# Patient Record
Sex: FEMALE | Race: WHITE | Employment: UNEMPLOYED | URBAN - METROPOLITAN AREA
[De-identification: names, ages, dates, MRNs, and addresses within clinical notes are randomized per-mention and may not be internally consistent; named-entity substitution may affect disease eponyms.]

---

## 2021-03-27 ENCOUNTER — IMMUNIZATION (OUTPATIENT)
Dept: PRIMARY CARE CLINIC | Facility: CLINIC | Age: 46
End: 2021-03-27

## 2021-03-27 DIAGNOSIS — Z23 NEED FOR VACCINATION: Primary | ICD-10-CM

## 2021-03-27 PROCEDURE — 0001A PR IMMUNIZ ADMIN, SARS-COV-2 COVID-19 VACC, 30MCG/0.3ML, 1ST DOSE: ICD-10-PCS | Mod: CV19,S$GLB,, | Performed by: INTERNAL MEDICINE

## 2021-03-27 PROCEDURE — 91300 PR SARS-COV- 2 COVID-19 VACCINE, NO PRSV, 30MCG/0.3ML, IM: CPT | Mod: S$GLB,,, | Performed by: INTERNAL MEDICINE

## 2021-03-27 PROCEDURE — 0001A PR IMMUNIZ ADMIN, SARS-COV-2 COVID-19 VACC, 30MCG/0.3ML, 1ST DOSE: CPT | Mod: CV19,S$GLB,, | Performed by: INTERNAL MEDICINE

## 2021-03-27 PROCEDURE — 91300 PR SARS-COV- 2 COVID-19 VACCINE, NO PRSV, 30MCG/0.3ML, IM: ICD-10-PCS | Mod: S$GLB,,, | Performed by: INTERNAL MEDICINE

## 2021-03-27 RX ADMIN — Medication 0.3 ML: at 02:03

## 2021-04-17 ENCOUNTER — IMMUNIZATION (OUTPATIENT)
Dept: PRIMARY CARE CLINIC | Facility: CLINIC | Age: 46
End: 2021-04-17

## 2021-04-17 DIAGNOSIS — Z23 NEED FOR VACCINATION: Primary | ICD-10-CM

## 2021-04-17 PROCEDURE — 0002A PR IMMUNIZ ADMIN, SARS-COV-2 COVID-19 VACC, 30MCG/0.3ML, 2ND DOSE: CPT | Mod: CV19,S$GLB,, | Performed by: INTERNAL MEDICINE

## 2021-04-17 PROCEDURE — 0002A PR IMMUNIZ ADMIN, SARS-COV-2 COVID-19 VACC, 30MCG/0.3ML, 2ND DOSE: ICD-10-PCS | Mod: CV19,S$GLB,, | Performed by: INTERNAL MEDICINE

## 2021-04-17 PROCEDURE — 91300 PR SARS-COV- 2 COVID-19 VACCINE, NO PRSV, 30MCG/0.3ML, IM: CPT | Mod: S$GLB,,, | Performed by: INTERNAL MEDICINE

## 2021-04-17 PROCEDURE — 91300 PR SARS-COV- 2 COVID-19 VACCINE, NO PRSV, 30MCG/0.3ML, IM: ICD-10-PCS | Mod: S$GLB,,, | Performed by: INTERNAL MEDICINE

## 2021-04-17 RX ADMIN — Medication 0.3 ML: at 10:04

## 2023-07-12 LAB
HUMAN PAPILLOMAVIRUS (HPV): NORMAL
PAP RECOMMENDATION EXT: NORMAL
PAP SMEAR: NORMAL

## 2025-02-12 ENCOUNTER — PATIENT OUTREACH (OUTPATIENT)
Dept: ADMINISTRATIVE | Facility: OTHER | Age: 50
End: 2025-02-12
Payer: COMMERCIAL

## 2025-02-12 ENCOUNTER — OFFICE VISIT (OUTPATIENT)
Dept: PRIMARY CARE CLINIC | Facility: CLINIC | Age: 50
End: 2025-02-12
Payer: COMMERCIAL

## 2025-02-12 ENCOUNTER — LAB VISIT (OUTPATIENT)
Dept: PRIMARY CARE CLINIC | Facility: CLINIC | Age: 50
End: 2025-02-12
Payer: COMMERCIAL

## 2025-02-12 VITALS
SYSTOLIC BLOOD PRESSURE: 134 MMHG | DIASTOLIC BLOOD PRESSURE: 83 MMHG | WEIGHT: 169 LBS | HEART RATE: 77 BPM | RESPIRATION RATE: 18 BRPM | OXYGEN SATURATION: 98 %

## 2025-02-12 DIAGNOSIS — Z13.220 SCREENING CHOLESTEROL LEVEL: ICD-10-CM

## 2025-02-12 DIAGNOSIS — Z11.59 ENCOUNTER FOR HEPATITIS C SCREENING TEST FOR LOW RISK PATIENT: ICD-10-CM

## 2025-02-12 DIAGNOSIS — Z13.29 SCREENING FOR THYROID DISORDER: ICD-10-CM

## 2025-02-12 DIAGNOSIS — Z00.00 ENCOUNTER FOR SCREENING AND PREVENTATIVE CARE: ICD-10-CM

## 2025-02-12 DIAGNOSIS — Z13.1 SCREENING FOR DIABETES MELLITUS: ICD-10-CM

## 2025-02-12 DIAGNOSIS — Z11.4 SCREENING FOR HIV (HUMAN IMMUNODEFICIENCY VIRUS): ICD-10-CM

## 2025-02-12 DIAGNOSIS — R92.8 ABNORMAL MAMMOGRAM: Primary | ICD-10-CM

## 2025-02-12 LAB
ALBUMIN SERPL BCP-MCNC: 4 G/DL (ref 3.5–5.2)
ALP SERPL-CCNC: 75 U/L (ref 40–150)
ALT SERPL W/O P-5'-P-CCNC: 17 U/L (ref 10–44)
ANION GAP SERPL CALC-SCNC: 9 MMOL/L (ref 8–16)
AST SERPL-CCNC: 16 U/L (ref 10–40)
B-HCG UR QL: NEGATIVE
BACTERIA #/AREA URNS AUTO: ABNORMAL /HPF
BASOPHILS # BLD AUTO: 0.04 K/UL (ref 0–0.2)
BASOPHILS NFR BLD: 0.7 % (ref 0–1.9)
BILIRUB SERPL-MCNC: 0.3 MG/DL (ref 0.1–1)
BILIRUB UR QL STRIP: NEGATIVE
BUN SERPL-MCNC: 14 MG/DL (ref 6–20)
CALCIUM SERPL-MCNC: 9.5 MG/DL (ref 8.7–10.5)
CHLORIDE SERPL-SCNC: 108 MMOL/L (ref 95–110)
CHOLEST SERPL-MCNC: 210 MG/DL (ref 120–199)
CHOLEST/HDLC SERPL: 3.2 {RATIO} (ref 2–5)
CLARITY UR REFRACT.AUTO: ABNORMAL
CO2 SERPL-SCNC: 23 MMOL/L (ref 23–29)
COLOR UR AUTO: ABNORMAL
CREAT SERPL-MCNC: 0.7 MG/DL (ref 0.5–1.4)
CTP QC/QA: YES
DIFFERENTIAL METHOD BLD: ABNORMAL
EOSINOPHIL # BLD AUTO: 0.1 K/UL (ref 0–0.5)
EOSINOPHIL NFR BLD: 0.9 % (ref 0–8)
ERYTHROCYTE [DISTWIDTH] IN BLOOD BY AUTOMATED COUNT: 12.6 % (ref 11.5–14.5)
EST. GFR  (NO RACE VARIABLE): >60 ML/MIN/1.73 M^2
ESTIMATED AVG GLUCOSE: 111 MG/DL (ref 68–131)
GLUCOSE SERPL-MCNC: 85 MG/DL (ref 70–110)
GLUCOSE UR QL STRIP: NEGATIVE
HBA1C MFR BLD: 5.5 % (ref 4–5.6)
HCT VFR BLD AUTO: 47.1 % (ref 37–48.5)
HCV AB SERPL QL IA: NORMAL
HDLC SERPL-MCNC: 65 MG/DL (ref 40–75)
HDLC SERPL: 31 % (ref 20–50)
HGB BLD-MCNC: 14.8 G/DL (ref 12–16)
HGB UR QL STRIP: NEGATIVE
HIV 1+2 AB+HIV1 P24 AG SERPL QL IA: NORMAL
IMM GRANULOCYTES # BLD AUTO: 0.01 K/UL (ref 0–0.04)
IMM GRANULOCYTES NFR BLD AUTO: 0.2 % (ref 0–0.5)
KETONES UR QL STRIP: NEGATIVE
LDLC SERPL CALC-MCNC: 129 MG/DL (ref 63–159)
LEUKOCYTE ESTERASE UR QL STRIP: ABNORMAL
LYMPHOCYTES # BLD AUTO: 1.7 K/UL (ref 1–4.8)
LYMPHOCYTES NFR BLD: 29.6 % (ref 18–48)
MCH RBC QN AUTO: 29.1 PG (ref 27–31)
MCHC RBC AUTO-ENTMCNC: 31.4 G/DL (ref 32–36)
MCV RBC AUTO: 93 FL (ref 82–98)
MICROSCOPIC COMMENT: ABNORMAL
MONOCYTES # BLD AUTO: 0.4 K/UL (ref 0.3–1)
MONOCYTES NFR BLD: 6.6 % (ref 4–15)
NEUTROPHILS # BLD AUTO: 3.5 K/UL (ref 1.8–7.7)
NEUTROPHILS NFR BLD: 62 % (ref 38–73)
NITRITE UR QL STRIP: NEGATIVE
NONHDLC SERPL-MCNC: 145 MG/DL
NRBC BLD-RTO: 0 /100 WBC
PH UR STRIP: 6 [PH] (ref 5–8)
PLATELET # BLD AUTO: 182 K/UL (ref 150–450)
PMV BLD AUTO: 11.7 FL (ref 9.2–12.9)
POTASSIUM SERPL-SCNC: 4 MMOL/L (ref 3.5–5.1)
PROT SERPL-MCNC: 7.4 G/DL (ref 6–8.4)
PROT UR QL STRIP: ABNORMAL
RBC # BLD AUTO: 5.08 M/UL (ref 4–5.4)
RBC #/AREA URNS AUTO: 2 /HPF (ref 0–4)
SODIUM SERPL-SCNC: 140 MMOL/L (ref 136–145)
SP GR UR STRIP: 1.03 (ref 1–1.03)
SQUAMOUS #/AREA URNS AUTO: 4 /HPF
T4 FREE SERPL-MCNC: 0.8 NG/DL (ref 0.71–1.51)
TRIGL SERPL-MCNC: 80 MG/DL (ref 30–150)
TSH SERPL DL<=0.005 MIU/L-ACNC: 3.27 UIU/ML (ref 0.4–4)
URN SPEC COLLECT METH UR: ABNORMAL
WBC # BLD AUTO: 5.57 K/UL (ref 3.9–12.7)
WBC #/AREA URNS AUTO: 0 /HPF (ref 0–5)

## 2025-02-12 PROCEDURE — 1159F MED LIST DOCD IN RCRD: CPT | Mod: CPTII,S$GLB,, | Performed by: NURSE PRACTITIONER

## 2025-02-12 PROCEDURE — 87389 HIV-1 AG W/HIV-1&-2 AB AG IA: CPT | Performed by: NURSE PRACTITIONER

## 2025-02-12 PROCEDURE — 85025 COMPLETE CBC W/AUTO DIFF WBC: CPT | Performed by: NURSE PRACTITIONER

## 2025-02-12 PROCEDURE — 81025 URINE PREGNANCY TEST: CPT | Mod: S$GLB,,, | Performed by: NURSE PRACTITIONER

## 2025-02-12 PROCEDURE — 80061 LIPID PANEL: CPT | Performed by: NURSE PRACTITIONER

## 2025-02-12 PROCEDURE — 99386 PREV VISIT NEW AGE 40-64: CPT | Mod: S$GLB,,, | Performed by: NURSE PRACTITIONER

## 2025-02-12 PROCEDURE — 83036 HEMOGLOBIN GLYCOSYLATED A1C: CPT | Performed by: NURSE PRACTITIONER

## 2025-02-12 PROCEDURE — 3075F SYST BP GE 130 - 139MM HG: CPT | Mod: CPTII,S$GLB,, | Performed by: NURSE PRACTITIONER

## 2025-02-12 PROCEDURE — 86803 HEPATITIS C AB TEST: CPT | Performed by: NURSE PRACTITIONER

## 2025-02-12 PROCEDURE — 99999 PR PBB SHADOW E&M-NEW PATIENT-LVL III: CPT | Mod: PBBFAC,,, | Performed by: NURSE PRACTITIONER

## 2025-02-12 PROCEDURE — 84439 ASSAY OF FREE THYROXINE: CPT | Performed by: NURSE PRACTITIONER

## 2025-02-12 PROCEDURE — 80053 COMPREHEN METABOLIC PANEL: CPT | Performed by: NURSE PRACTITIONER

## 2025-02-12 PROCEDURE — 81001 URINALYSIS AUTO W/SCOPE: CPT | Performed by: NURSE PRACTITIONER

## 2025-02-12 PROCEDURE — 99213 OFFICE O/P EST LOW 20 MIN: CPT | Mod: 25,S$GLB,, | Performed by: NURSE PRACTITIONER

## 2025-02-12 PROCEDURE — 3079F DIAST BP 80-89 MM HG: CPT | Mod: CPTII,S$GLB,, | Performed by: NURSE PRACTITIONER

## 2025-02-12 PROCEDURE — 84443 ASSAY THYROID STIM HORMONE: CPT | Performed by: NURSE PRACTITIONER

## 2025-02-12 NOTE — PROGRESS NOTES
Subjective:       Patient ID: Otilia Meier is a 49 y.o. female.    Chief Complaint: Establish Care (Need Mammogram ordered)    History of Present Illness    CHIEF COMPLAINT:  Patient presents today to establish care and follow up on abnormal mammogram.    BREAST HEALTH:  Mammogram in January 2025 at [facility] revealed abnormality. She denies any family history of breast cancer or palpable breast masses.    GYNECOLOGIC HISTORY:  She has Mirena IUD in place. Last pap smear was performed over 2 years ago.    MEDICAL HISTORY:  Last primary care visit was in June 2024 for routine bloodwork and preventive care. She reports infrequent medical visits due to rarely being ill. She denies any known history of vitamin D deficiency.    SOCIAL HISTORY:  She is a single parent and  of one year with two children ages 5 and 21. She resides between [place] and [place].     No past medical history on file.     No past surgical history on file.     No family history on file.    Social History     Tobacco Use   Smoking Status Not on file   Smokeless Tobacco Not on file       Social History     Social History Narrative    Not on file       Review of patient's allergies indicates:   Allergen Reactions    Hydrocodone-acetaminophen Other (See Comments)     vomiting        Review of Systems   Respiratory:  Negative for chest tightness and shortness of breath.    Cardiovascular:  Negative for chest pain and palpitations.   Gastrointestinal:  Negative for nausea and vomiting.   Neurological:  Negative for dizziness, light-headedness and headaches.   Psychiatric/Behavioral:  The patient is nervous/anxious.          Objective:      Vitals:    02/12/25 0827   BP: 134/83   BP Location: Left arm   Patient Position: Sitting   Pulse: 77   Resp: 18   SpO2: 98%   Weight: 76.6 kg (168 lb 15.7 oz)        Physical Exam  Constitutional:       Appearance: Normal appearance.   Pulmonary:      Effort: Pulmonary effort is  normal. No respiratory distress.   Neurological:      Mental Status: She is alert. She is disoriented.   Psychiatric:         Mood and Affect: Mood is anxious. Affect is tearful.         Speech: Speech normal.         Behavior: Behavior is cooperative.         Assessment:       1. Abnormal mammogram    2. Encounter for screening and preventative care    3. Screening cholesterol level    4. Encounter for hepatitis C screening test for low risk patient    5. Screening for diabetes mellitus    6. Screening for thyroid disorder    7. Screening for HIV (human immunodeficiency virus)        Plan:       Abnormal mammogram  Comments:  Jan 2025 [place], [place]  Orders:  -     Mammo Digital Screening Bilat w/ Nguyễn; Future; Expected date: 02/12/2025    Encounter for screening and preventative care  -     CBC Auto Differential; Future; Expected date: 02/12/2025  -     Comprehensive Metabolic Panel; Future; Expected date: 02/12/2025  -     Urinalysis, Reflex to Urine Culture Urine, Clean Catch  -     POCT urine pregnancy    Screening cholesterol level  -     Lipid Panel; Future; Expected date: 02/12/2025    Encounter for hepatitis C screening test for low risk patient  -     Hepatitis C Antibody; Future; Expected date: 02/12/2025    Screening for diabetes mellitus  -     Hemoglobin A1C; Future; Expected date: 02/12/2025    Screening for thyroid disorder  -     T4, Free; Future; Expected date: 02/12/2025  -     TSH; Future; Expected date: 02/12/2025    Screening for HIV (human immunodeficiency virus)  -     HIV 1/2 Ag/Ab (4th Gen); Future; Expected date: 02/12/2025         Assessment & Plan  ABNORMAL MAMMOGRAM:  - Explained the need for follow-up mammogram.  - Requested the patient to upload previous mammogram report from January 2025 to the patient portal.  - Ordered a new mammogram for further evaluation.  - Planned to request records from [facility] in [place], [place].  - Will review  the previous records upon receipt.    IUD MANAGEMENT:  - Noted that the patient has had an IUD for an extended period, which has eliminated her menstrual cycle.  - Confirmed that the patient believes she has a Mirena IUD.  - Acknowledged the presence of the IUD and its impact on the patient's menstrual cycle.    PREVENTATIVE CARE:  - Discussed importance of preventative healthcare and annual check-ups.  - Patient to sign release of information form for previous medical records.  - Blood work ordered: diabetes screening, cholesterol, anemia check, kidney and liver function, electrolytes, thyroid function.  - Urinalysis ordered.    PRIMARY CARE REFERRAL:  - Referred to Dr. Bo for establishing care.    FOLLOW UP:  - Follow up in 1 month with Dr. Bo.  - Contact the office if any concerns arise before next appointment.     Follow up in about 1 month (around 3/12/2025) for Dr. Kelly Bo establish care.    I spent a total of 40 minutes on the day of the visit.This includes face to face time and non-face to face time preparing to see the patient (eg, review of tests), obtaining and/or reviewing separately obtained history, documenting clinical information in the electronic or other health record, independently interpreting results and communicating results to the patient/family/caregiver, or care coordinator.     YUDY Lr, MSN, FNP-C     This note was generated with the assistance of ambient listening technology. Verbal consent was obtained by the patient and accompanying visitor(s) for the recording of patient appointment to facilitate this note. I attest to having reviewed and edited the generated note for accuracy, though some syntax or spelling errors may persist. Please contact the author of this note for any clarification.

## 2025-02-12 NOTE — PROGRESS NOTES
CHW - Initial Contact    This Community Health Worker completed  the Social Determinant of Health questionnaire with patient during clinic visit today.    Pt identified barriers of most importance are: NONE   Referrals to community agencies completed with patient/caregiver consent outside of [facility] include: NONE  Referrals were put through [facility] - : NO  Support and Services: No support & services have been documented.  Other information discussed the patient needs / wants help with: NONE   Follow up required: NO  No future outreach task assigned

## 2025-02-12 NOTE — PATIENT INSTRUCTIONS
You can call any of the following numbers to schedule your referral, if you could not get scheduled today: [phone] or [phone].

## 2025-02-13 ENCOUNTER — HOSPITAL ENCOUNTER (OUTPATIENT)
Dept: RADIOLOGY | Facility: HOSPITAL | Age: 50
Discharge: HOME | End: 2025-02-13
Attending: NURSE PRACTITIONER
Payer: COMMERCIAL

## 2025-02-13 VITALS — WEIGHT: 168 LBS | HEIGHT: 63 IN | BODY MASS INDEX: 29.77 KG/M2

## 2025-02-13 DIAGNOSIS — R92.8 ABNORMAL MAMMOGRAM: ICD-10-CM

## 2025-02-27 ENCOUNTER — HOSPITAL ENCOUNTER (OUTPATIENT)
Dept: RADIOLOGY | Facility: HOSPITAL | Age: 50
Discharge: HOME | End: 2025-02-27
Attending: NURSE PRACTITIONER
Payer: COMMERCIAL

## 2025-02-27 ENCOUNTER — TELEPHONE (OUTPATIENT)
Dept: RADIOLOGY | Facility: HOSPITAL | Age: 50
End: 2025-02-27

## 2025-02-27 DIAGNOSIS — R92.8 ABNORMAL MAMMOGRAM: ICD-10-CM

## 2025-02-27 PROCEDURE — 77065 DX MAMMO INCL CAD UNI: CPT | Mod: 26,LT,, | Performed by: RADIOLOGY

## 2025-02-27 PROCEDURE — 77065 DX MAMMO INCL CAD UNI: CPT | Mod: TC,LT

## 2025-02-27 PROCEDURE — 77061 BREAST TOMOSYNTHESIS UNI: CPT | Mod: 26,LT,, | Performed by: RADIOLOGY

## 2025-02-27 NOTE — TELEPHONE ENCOUNTER
Spoke with patient. Reviewed breast biopsy procedure and reviewed instructions for breast biopsy. Patient expressed understanding and all questions were answered. Provided patient with my phone number to call for any further concerns or questions.   Patient scheduled breast biopsy at the [facility] on 3/13/2025.

## 2025-03-08 ENCOUNTER — TELEPHONE (OUTPATIENT)
Dept: PRIMARY CARE CLINIC | Facility: CLINIC | Age: 50
End: 2025-03-08
Payer: COMMERCIAL

## 2025-03-08 NOTE — TELEPHONE ENCOUNTER
Called pt to r/s visit due to provider being out of office, pt informed and agreed to another time.

## 2025-03-13 ENCOUNTER — HOSPITAL ENCOUNTER (OUTPATIENT)
Dept: RADIOLOGY | Facility: HOSPITAL | Age: 50
Discharge: HOME | End: 2025-03-13
Attending: NURSE PRACTITIONER
Payer: COMMERCIAL

## 2025-03-13 DIAGNOSIS — R92.8 ABNORMAL FINDING ON BREAST IMAGING: Primary | ICD-10-CM

## 2025-03-13 PROCEDURE — 19081 BX BREAST 1ST LESION STRTCTC: CPT | Mod: LT,,, | Performed by: RADIOLOGY

## 2025-03-13 PROCEDURE — 63600175 PHARM REV CODE 636 W HCPCS: Performed by: NURSE PRACTITIONER

## 2025-03-13 PROCEDURE — 77065 DX MAMMO INCL CAD UNI: CPT | Mod: 26,LT,, | Performed by: RADIOLOGY

## 2025-03-13 PROCEDURE — 27200939 MAMMO BREAST STEREOTACTIC BREAST BIOPSY LEFT

## 2025-03-13 PROCEDURE — 77065 DX MAMMO INCL CAD UNI: CPT | Mod: TC,LT

## 2025-03-13 RX ORDER — LIDOCAINE HYDROCHLORIDE 10 MG/ML
3 INJECTION, SOLUTION EPIDURAL; INFILTRATION; INTRACAUDAL; PERINEURAL ONCE
Status: DISCONTINUED | OUTPATIENT
Start: 2025-03-13 | End: 2025-03-14 | Stop reason: HOSPADM

## 2025-03-13 RX ORDER — LIDOCAINE HYDROCHLORIDE AND EPINEPHRINE 10; 20 UG/ML; MG/ML
20 INJECTION, SOLUTION INFILTRATION; PERINEURAL ONCE
Status: DISCONTINUED | OUTPATIENT
Start: 2025-03-13 | End: 2025-03-14 | Stop reason: HOSPADM

## 2025-03-13 RX ORDER — LIDOCAINE HYDROCHLORIDE AND EPINEPHRINE 10; 20 UG/ML; MG/ML
20 INJECTION, SOLUTION INFILTRATION; PERINEURAL ONCE
Status: COMPLETED | OUTPATIENT
Start: 2025-03-13 | End: 2025-03-13

## 2025-03-13 RX ORDER — LIDOCAINE HYDROCHLORIDE 10 MG/ML
3 INJECTION, SOLUTION EPIDURAL; INFILTRATION; INTRACAUDAL; PERINEURAL ONCE
Status: COMPLETED | OUTPATIENT
Start: 2025-03-13 | End: 2025-03-13

## 2025-03-13 RX ADMIN — LIDOCAINE HYDROCHLORIDE 3 ML: 10 INJECTION, SOLUTION EPIDURAL; INFILTRATION; INTRACAUDAL; PERINEURAL at 01:03

## 2025-03-13 RX ADMIN — LIDOCAINE HYDROCHLORIDE,EPINEPHRINE BITARTRATE 20 ML: 20; .01 INJECTION, SOLUTION INFILTRATION; PERINEURAL at 01:03

## 2025-03-17 ENCOUNTER — PATIENT OUTREACH (OUTPATIENT)
Dept: ADMINISTRATIVE | Facility: HOSPITAL | Age: 50
End: 2025-03-17
Payer: COMMERCIAL

## 2025-03-17 ENCOUNTER — OFFICE VISIT (OUTPATIENT)
Dept: PRIMARY CARE CLINIC | Facility: CLINIC | Age: 50
End: 2025-03-17
Payer: COMMERCIAL

## 2025-03-17 VITALS
WEIGHT: 175.13 LBS | SYSTOLIC BLOOD PRESSURE: 139 MMHG | BODY MASS INDEX: 31.03 KG/M2 | HEART RATE: 59 BPM | DIASTOLIC BLOOD PRESSURE: 84 MMHG | TEMPERATURE: 99 F | OXYGEN SATURATION: 98 % | HEIGHT: 63 IN

## 2025-03-17 DIAGNOSIS — E78.5 HYPERLIPIDEMIA, UNSPECIFIED HYPERLIPIDEMIA TYPE: Primary | ICD-10-CM

## 2025-03-17 PROCEDURE — 3079F DIAST BP 80-89 MM HG: CPT | Mod: CPTII,S$GLB,, | Performed by: FAMILY MEDICINE

## 2025-03-17 PROCEDURE — 99213 OFFICE O/P EST LOW 20 MIN: CPT | Mod: S$GLB,,, | Performed by: FAMILY MEDICINE

## 2025-03-17 PROCEDURE — 3075F SYST BP GE 130 - 139MM HG: CPT | Mod: CPTII,S$GLB,, | Performed by: FAMILY MEDICINE

## 2025-03-17 PROCEDURE — 99999 PR PBB SHADOW E&M-EST. PATIENT-LVL III: CPT | Mod: PBBFAC,,, | Performed by: FAMILY MEDICINE

## 2025-03-17 PROCEDURE — 3008F BODY MASS INDEX DOCD: CPT | Mod: CPTII,S$GLB,, | Performed by: FAMILY MEDICINE

## 2025-03-17 PROCEDURE — 1159F MED LIST DOCD IN RCRD: CPT | Mod: CPTII,S$GLB,, | Performed by: FAMILY MEDICINE

## 2025-03-17 PROCEDURE — 3044F HG A1C LEVEL LT 7.0%: CPT | Mod: CPTII,S$GLB,, | Performed by: FAMILY MEDICINE

## 2025-03-17 PROCEDURE — 1160F RVW MEDS BY RX/DR IN RCRD: CPT | Mod: CPTII,S$GLB,, | Performed by: FAMILY MEDICINE

## 2025-03-18 ENCOUNTER — RESULTS FOLLOW-UP (OUTPATIENT)
Dept: RADIOLOGY | Facility: HOSPITAL | Age: 50
End: 2025-03-18

## 2025-03-19 ENCOUNTER — TELEPHONE (OUTPATIENT)
Dept: SURGERY | Facility: CLINIC | Age: 50
End: 2025-03-19
Payer: COMMERCIAL

## 2025-03-19 NOTE — TELEPHONE ENCOUNTER
Contacted the patient regarding results of recent breast biopsy. Patient did not answer, message left with my name and direct number for patient to contact back.

## 2025-03-19 NOTE — PROGRESS NOTES
Subjective:       Patient ID: Otilia Meier is a 49 y.o. female.    Chief Complaint: Follow-up    History of Present Illness    CHIEF COMPLAINT:  Patient presents today for follow-up    BREAST HEALTH:  She has a 15-year history of breast issues and is currently awaiting pathology results from her third breast biopsy. She had a lump removed approximately 10 years ago. She denies strong family history of breast cancer.    PREVENTIVE HEALTH:  Her mammogram is up to date. Her last pap smear was 2-3 years ago while living in [place].    LABS:  Cholesterol was borderline at 210 with LDL at 129. Urinalysis was negative for UTI. Kidney and liver function tests were normal. Complete blood count showed no evidence of anemia. Thyroid function tests were within normal limits.    SOCIAL HISTORY:  She has never smoked and denies current alcohol or illicit drug use. She frequently cooks at home, avoiding fried foods. She maintains a garden and reports improved eating habits during spring and summer months.    FAMILY HISTORY:  Both parents are living but overweight with suspected hypertension and alcoholism. Grandmother is 90 years old and in relatively good health. Maternal grandmother  from brain aneurysm, maternal grandfather  of heart attack, and paternal grandfather  of natural causes.      ROS:  Breasts: +breast lumps          Review of patient's allergies indicates:   Allergen Reactions    Hydrocodone-acetaminophen Other (See Comments)     vomiting       Current Medications[1]    History reviewed. No pertinent past medical history.   Past Surgical History:   Procedure Laterality Date    AUGMENTATION OF BREAST Bilateral     15 years ago    BREAST BIOPSY Left     benign excisional biopsy 6 years ago    BREAST RECONSTRUCTION Bilateral     15 YEARS AGO.  reduction and lift      Social History     Socioeconomic History    Marital status:    Tobacco Use    Smoking status: Never    Smokeless tobacco:  Never   Substance and Sexual Activity    Alcohol use: Not Currently    Drug use: Never    Sexual activity: Not Currently     Social Drivers of Health     Financial Resource Strain: Low Risk  (3/11/2025)    Overall Financial Resource Strain (CARDIA)     Difficulty of Paying Living Expenses: Not hard at all   Food Insecurity: No Food Insecurity (3/11/2025)    Hunger Vital Sign     Worried About Running Out of Food in the Last Year: Never true     Ran Out of Food in the Last Year: Never true   Transportation Needs: No Transportation Needs (3/11/2025)    PRAPARE - Transportation     Lack of Transportation (Medical): No     Lack of Transportation (Non-Medical): No   Physical Activity: Sufficiently Active (3/11/2025)    Exercise Vital Sign     Days of Exercise per Week: 2 days     Minutes of Exercise per Session: 120 min   Stress: Stress Concern Present (3/11/2025)    [employer] - Occupational Stress Questionnaire     Feeling of Stress : To some extent   Housing Stability: Low Risk  (3/11/2025)    Housing Stability Vital Sign     Unable to Pay for Housing in the Last Year: No     Number of Times Moved in the Last Year: 0     Homeless in the Last Year: No      Family History   Problem Relation Name Age of Onset    Alcohol abuse Mother      Alcohol abuse Father      Brain aneurysm Maternal Grandmother      Heart disease Paternal Grandfather          MI     Review of Systems    Objective:      Physical Exam      Physical exam:  General: Alert, no acute distress   HEENT:  NC/AT, no facial swelling of deformity  Neck:  supple  Resp:  Normal effort, no respiratory distress   Psych:  Normal affect events          Assessment:       1. Hyperlipidemia, unspecified hyperlipidemia type        Plan:         Otilia was seen today for follow-up.    Diagnoses and all orders for this visit:    Hyperlipidemia, unspecified hyperlipidemia type        Assessment & Plan    HYPERLIPIDEMIA:  - Evaluated the patient's  recent cholesterol levels from lab work.  - Total cholesterol is 210, which is borderline high.  - LDL cholesterol is 129, which is within the normal range but close to the upper limit of 130.  - Assessed that the cholesterol levels are borderline but not alarming.  - Advised the patient to be cautious with fried food consumption.  - Patient to continue home cooking and gardening practices to maintain a healthy diet.  - Recommend considering oven-frying chicken as a healthier alternative to deep-frying.    BREAST ISSUES:  - Noted the patient's history of breast issues, including multiple biopsies and a lump removal over the past 15 years.  - Awaiting pathology results from a recent breast biopsy.  - Plan to review mammogram results when available.  - Will follow up on biopsy results to address ongoing breast issues.    FAMILY HISTORY OF CARDIOVASCULAR DISEASE:  - Documented that the patient's maternal grandfather  of a myocardial infarction.  - Acknowledged the family history of cardiovascular disease.  - Documented that the patient's maternal grandmother passed away from a cerebral aneurysm.    FAMILY HISTORY OF ALCOHOLISM:  - Noted that both of the patient's parents have a history of alcoholism.          Kelly Bo MD        This note was generated with the assistance of ambient listening technology. Verbal consent was obtained by the patient and accompanying visitor(s) for the recording of patient appointment to facilitate this note. I attest to having reviewed and edited the generated note for accuracy, though some syntax or spelling errors may persist. Please contact the author of this note for any clarification.   Wishes to stay from         [1] No current outpatient medications on file.

## 2025-03-21 ENCOUNTER — TELEPHONE (OUTPATIENT)
Dept: SURGERY | Facility: CLINIC | Age: 50
End: 2025-03-21
Payer: COMMERCIAL

## 2025-03-21 NOTE — TELEPHONE ENCOUNTER
Called patient with biopsy results from 3/13/2025. Biopsy results showed Flat Epithelial Lesion (FEA). Discussed what this means and the results were reviewed by Dr. Muniz . These results are benign, concordant and a surgical consult is recommended. Patient was scheduled on 3/31/2025 with Dr. Harris. Reviewed Breast center location. Patient verbalized understanding.      Charlene Muniz MD to [facility] Breast Navigation (Selected Message)      3/18/25  4:43 PM  Result Note  Benign and concordant. Recommend surgical consult for flat epithelial atypia.

## 2025-03-26 ENCOUNTER — RESULTS FOLLOW-UP (OUTPATIENT)
Dept: PRIMARY CARE CLINIC | Facility: CLINIC | Age: 50
End: 2025-03-26

## 2025-03-26 DIAGNOSIS — N60.82 FLAT EPITHELIAL ATYPIA OF BREAST, LEFT: Primary | ICD-10-CM

## 2025-04-07 ENCOUNTER — TELEPHONE (OUTPATIENT)
Dept: PLASTIC SURGERY | Facility: CLINIC | Age: 50
End: 2025-04-07
Payer: COMMERCIAL

## 2025-04-07 ENCOUNTER — OFFICE VISIT (OUTPATIENT)
Dept: SURGERY | Facility: CLINIC | Age: 50
End: 2025-04-07
Payer: COMMERCIAL

## 2025-04-07 VITALS — DIASTOLIC BLOOD PRESSURE: 83 MMHG | HEART RATE: 65 BPM | SYSTOLIC BLOOD PRESSURE: 122 MMHG

## 2025-04-07 DIAGNOSIS — N60.82 FLAT EPITHELIAL ATYPIA OF BREAST, LEFT: ICD-10-CM

## 2025-04-07 PROCEDURE — 1160F RVW MEDS BY RX/DR IN RCRD: CPT | Mod: CPTII,S$GLB,, | Performed by: SURGERY

## 2025-04-07 PROCEDURE — 1159F MED LIST DOCD IN RCRD: CPT | Mod: CPTII,S$GLB,, | Performed by: SURGERY

## 2025-04-07 PROCEDURE — 3079F DIAST BP 80-89 MM HG: CPT | Mod: CPTII,S$GLB,, | Performed by: SURGERY

## 2025-04-07 PROCEDURE — 3044F HG A1C LEVEL LT 7.0%: CPT | Mod: CPTII,S$GLB,, | Performed by: SURGERY

## 2025-04-07 PROCEDURE — 99999 PR PBB SHADOW E&M-EST. PATIENT-LVL III: CPT | Mod: PBBFAC,,, | Performed by: SURGERY

## 2025-04-07 PROCEDURE — 3074F SYST BP LT 130 MM HG: CPT | Mod: CPTII,S$GLB,, | Performed by: SURGERY

## 2025-04-07 PROCEDURE — 99204 OFFICE O/P NEW MOD 45 MIN: CPT | Mod: S$GLB,,, | Performed by: SURGERY

## 2025-04-07 NOTE — PROGRESS NOTES
History & Physical    Subjective     History of Present Illness:  Patient is a 50 y.o. female presents with  atypia noted on core biopsy upper outer left breast 10 cm from the nipple  Referred for excision to rule out co-existing malignancy    Chief Complaint   Patient presents with    Consult       Review of patient's allergies indicates:   Allergen Reactions    Hydrocodone-acetaminophen Other (See Comments)     vomiting       Current Medications[1]    History reviewed. No pertinent past medical history.  Past Surgical History:   Procedure Laterality Date    AUGMENTATION OF BREAST Bilateral     15 years ago    BREAST BIOPSY Left     benign excisional biopsy 6 years ago    BREAST RECONSTRUCTION Bilateral     15 YEARS AGO.  reduction and lift     Family History   Problem Relation Name Age of Onset    Alcohol abuse Mother      Alcohol abuse Father      Brain aneurysm Maternal Grandmother      Heart disease Paternal Grandfather          MI     Social History[2]     Review of Systems:  Review of Systems   Constitutional: Negative.    Respiratory: Negative.     Cardiovascular: Negative.    Endocrine: Negative.    Musculoskeletal: Negative.    Allergic/Immunologic: Negative.    Neurological: Negative.    Hematological: Negative.    Psychiatric/Behavioral: Negative.            Objective     Vital Signs (Most Recent)  Pulse: 65 (04/07/25 0836)  BP: 122/83 (04/07/25 0836)           Physical Exam:  Physical Exam  Constitutional:       Appearance: Normal appearance. She is normal weight.   HENT:      Head: Normocephalic and atraumatic.      Nose: Nose normal.   Cardiovascular:      Rate and Rhythm: Normal rate and regular rhythm.      Pulses: Normal pulses.   Pulmonary:      Effort: Pulmonary effort is normal.      Comments: Normal breast exam  She has the unique history of a breast reduction with implants which she does not like  Musculoskeletal:         General: Normal range of motion.      Cervical back: Normal range of  motion.   Skin:     General: Skin is warm and dry.   Neurological:      General: No focal deficit present.      Mental Status: She is alert and oriented to person, place, and time.   Psychiatric:         Mood and Affect: Mood normal.         Behavior: Behavior normal.                Assessment and Plan   Left breast atypia    PLAN:    Radar reflector localized left breast excision  Will ask Plastics to see patient - can consider implant removal (per patient request) at the time of lumepctomy              [1]   No current outpatient medications on file.     No current facility-administered medications for this visit.   [2]   Social History  Tobacco Use    Smoking status: Never    Smokeless tobacco: Never   Substance Use Topics    Alcohol use: Not Currently    Drug use: Never

## 2025-04-14 ENCOUNTER — OFFICE VISIT (OUTPATIENT)
Dept: PLASTIC SURGERY | Facility: CLINIC | Age: 50
End: 2025-04-14
Payer: COMMERCIAL

## 2025-04-14 VITALS
SYSTOLIC BLOOD PRESSURE: 138 MMHG | HEIGHT: 63 IN | HEART RATE: 60 BPM | BODY MASS INDEX: 30.51 KG/M2 | WEIGHT: 172.19 LBS | DIASTOLIC BLOOD PRESSURE: 81 MMHG

## 2025-04-14 DIAGNOSIS — N60.82 FLAT EPITHELIAL ATYPIA (FEA) OF LEFT BREAST: ICD-10-CM

## 2025-04-14 DIAGNOSIS — Z98.82 HISTORY OF BREAST IMPLANT: Primary | ICD-10-CM

## 2025-04-14 PROCEDURE — 1159F MED LIST DOCD IN RCRD: CPT | Mod: CPTII,S$GLB,, | Performed by: SURGERY

## 2025-04-14 PROCEDURE — 3008F BODY MASS INDEX DOCD: CPT | Mod: CPTII,S$GLB,, | Performed by: SURGERY

## 2025-04-14 PROCEDURE — 3075F SYST BP GE 130 - 139MM HG: CPT | Mod: CPTII,S$GLB,, | Performed by: SURGERY

## 2025-04-14 PROCEDURE — 1160F RVW MEDS BY RX/DR IN RCRD: CPT | Mod: CPTII,S$GLB,, | Performed by: SURGERY

## 2025-04-14 PROCEDURE — 3044F HG A1C LEVEL LT 7.0%: CPT | Mod: CPTII,S$GLB,, | Performed by: SURGERY

## 2025-04-14 PROCEDURE — 3079F DIAST BP 80-89 MM HG: CPT | Mod: CPTII,S$GLB,, | Performed by: SURGERY

## 2025-04-14 PROCEDURE — 99999 PR PBB SHADOW E&M-EST. PATIENT-LVL III: CPT | Mod: PBBFAC,,, | Performed by: SURGERY

## 2025-04-14 PROCEDURE — 99204 OFFICE O/P NEW MOD 45 MIN: CPT | Mod: S$GLB,,, | Performed by: SURGERY

## 2025-04-14 NOTE — PROGRESS NOTES
Plastic & Reconstructive Surgery    Breast Reconstruction Consult     Patient: Otilia Meier  MRN: [id number]  : 1975  Date of Service: 2025  PCP: Kelly Bo MD  Referring Provider:  No ref. provider found     Chief Complaint: implant removal / L breast atypia     History of Present Illness:  Otilia Meier is a 50 y.o. female who presents with recent diagnosis of left breast atypia.      S/p reduction mastopexy / aug 15 yrs ago.   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     Past Surgical History:   Procedure Laterality Date    AUGMENTATION OF BREAST Bilateral     15 years ago    BREAST BIOPSY Left     benign excisional biopsy 6 years ago    BREAST RECONSTRUCTION Bilateral     15 YEARS AGO.  reduction and lift        Family History   Problem Relation Name Age of Onset    Alcohol abuse Mother      Alcohol abuse Father      Brain aneurysm Maternal Grandmother      Heart disease Paternal Grandfather          MI        Social History     Socioeconomic History    Marital status:    Tobacco Use    Smoking status: Never    Smokeless tobacco: Never   Substance and Sexual Activity    Alcohol use: Not Currently    Drug use: Never    Sexual activity: Not Currently     Social Drivers of Health     Financial Resource Strain: Low Risk  (3/11/2025)    Overall Financial Resource Strain (CARDIA)     Difficulty of Paying Living Expenses: Not hard at all   Food Insecurity: No Food Insecurity (3/11/2025)    Hunger Vital Sign     Worried About Running Out of Food in the  Last Year: Never true     Ran Out of Food in the Last Year: Never true   Transportation Needs: No Transportation Needs (3/11/2025)    PRAPARE - Transportation     Lack of Transportation (Medical): No     Lack of Transportation (Non-Medical): No   Physical Activity: Sufficiently Active (3/11/2025)    Exercise Vital Sign     Days of Exercise per Week: 2 days     Minutes of Exercise per Session: 120 min   Stress: Stress Concern Present (3/11/2025)    [employer] - Occupational Stress Questionnaire     Feeling of Stress : To some extent   Housing Stability: Low Risk  (3/11/2025)    Housing Stability Vital Sign     Unable to Pay for Housing in the Last Year: No     Number of Times Moved in the Last Year: 0     Homeless in the Last Year: No         Review of patient's allergies indicates:   Allergen Reactions    Hydrocodone-acetaminophen Other (See Comments)     vomiting        Medications Ordered Prior to Encounter[1]     Review of Systems:  Negative as per HPI     Physical Exam:  Vitals:    04/14/25 0851   BP: 138/81   Pulse: 60     Gen: NAD, A&O x3  Pulm: unlabored  CV: regular rate  Breast:  large breasts with pseudoptosis noted; implants soft and supple, no evidence of contracture; periareolar incisions noted, widened NAC; Nipple sensation intact, though increased sensation on right. Min TTP; slight stigmata of rash noted;   Measurements below in cm:      Right Breast        Left Breast  SN to Nipple:  25   24  Nipple to IMF:  18.5   18.5  Base Diameter: 15   15  Subaxillary Rolls: moderate       moderate     Labs / Imaging / Biopsies:  reviewed    Assessment and Plan:  Otilia Meier is a 50 y.o. female who presents with Left breast atypia.      I had a lengthy discussion with the patient.  She has a newly diagnosed breast atypia.  I explained the my role is to help reconstruct the breast with the atypia as well as to provide symmetry to the contralateral side.  I explained that her  reconstructive options include no surgery, implant based reconstruction, both immediate and delayed, as well as autologous tissue reconstruction.  I also reviewed the options regarding her nipple-areolar complex, should this be necessary based on her treatment.  She verbalizes understanding of this.     I explained the effects that chemotherapy and radiation may have on her reconstruction.  I also discussed how radiation will have an effect on her tissue, as well as an implant, if used.  We discussed how this may limit certain types of reconstruction or require additional procedures.  If she desires implant reconstruction and is a good candidate for this, my preference is to perform this prior to radiation therapy.  If she desires autologous reconstruction, my preference is to perform this after radiation therapy.  She verbalizes understanding of this.       Based on her specific diagnosis as well as her overall history, I recommend that she undergo bilateral breast implant removal, left oncoplastic mastopexy, right reduction mastopexy for symmetry.  Implant removal will help with long-term surveillance and imaging.     I described the risks and rationale of surgical treatment, including, but not limited to, bleeding, infection, pain, scarring, hematoma, seroma, asymmetry, wound dehiscence, delayed wound healing, change of nipple sensation, loss of nipple areola complex, and need for further surgery.  All questions were answered.  The patient verbalized understanding and wished to proceed.      We will plan for bilateral breast implant removal, left oncoplastic mastopexy, right reduction mastopexy for symmetry.  We will coordinate with her breast surgeon and schedule appropriately.  Patient may call back with any questions or concerns in the interim.      Follow up: surgery  Restriction: none     I spent 45 minutes on this patient encounter which included review of medical records.  Over half the time was spent with  the patient face to face discussing the treatment plan, counseling and coordinating care.     Chayo Cagle  4/14/2025 9:03 AM     This document was transcribed using voice recognition software without a human .  It may contain typographical, grammatical, and/or syntax errors.           [1]   No current outpatient medications on file prior to visit.     No current facility-administered medications on file prior to visit.

## 2025-04-17 DIAGNOSIS — N60.82 FLAT EPITHELIAL ATYPIA OF BREAST, LEFT: Primary | ICD-10-CM

## 2025-05-13 ENCOUNTER — PATIENT MESSAGE (OUTPATIENT)
Dept: SURGERY | Facility: CLINIC | Age: 50
End: 2025-05-13
Payer: COMMERCIAL

## 2025-05-14 ENCOUNTER — HOSPITAL ENCOUNTER (OUTPATIENT)
Dept: RADIOLOGY | Facility: HOSPITAL | Age: 50
Discharge: HOME | End: 2025-05-14
Attending: SURGERY
Payer: COMMERCIAL

## 2025-05-14 ENCOUNTER — ANESTHESIA EVENT (OUTPATIENT)
Dept: SURGERY | Facility: HOSPITAL | Age: 50
End: 2025-05-14
Payer: COMMERCIAL

## 2025-05-14 DIAGNOSIS — R92.8 ABNORMAL FINDING ON BREAST IMAGING: ICD-10-CM

## 2025-05-14 PROCEDURE — 63600175 PHARM REV CODE 636 W HCPCS: Performed by: SURGERY

## 2025-05-14 PROCEDURE — 77065 DX MAMMO INCL CAD UNI: CPT | Mod: TC,LT

## 2025-05-14 PROCEDURE — 77065 DX MAMMO INCL CAD UNI: CPT | Mod: 26,LT,, | Performed by: RADIOLOGY

## 2025-05-14 PROCEDURE — 19281 PERQ DEVICE BREAST 1ST IMAG: CPT | Mod: LT

## 2025-05-14 PROCEDURE — 19281 PERQ DEVICE BREAST 1ST IMAG: CPT | Mod: LT,,, | Performed by: RADIOLOGY

## 2025-05-14 RX ORDER — LIDOCAINE HYDROCHLORIDE 20 MG/ML
10 INJECTION, SOLUTION INFILTRATION; PERINEURAL ONCE
Status: COMPLETED | OUTPATIENT
Start: 2025-05-14 | End: 2025-05-14

## 2025-05-14 RX ADMIN — LIDOCAINE HYDROCHLORIDE 10 ML: 20 INJECTION, SOLUTION INFILTRATION; PERINEURAL at 09:05

## 2025-05-14 NOTE — ANESTHESIA PREPROCEDURE EVALUATION
[facility]  Anesthesia Pre-Operative Evaluation   05/14/2025        Patient Name: Otilia Meier  YOB: 1975  MRN: [id number]    Subjective  Pre-operative evaluation for Procedure(s) (LRB):  LUMPECTOMY,BREAST,WITH RADIOACTIVE SEED LOCALIZATION, left breast (Left)  REMOVAL, IMPLANT, BREAST (Bilateral)    Otilia Meier is a 50 y.o. female w/ a significant PMHx of: prior reduction mastopexy with aug, now with atypia found in left breast planning for above.     No past medical history on file.    Current Inpatient Medications:       Medications Ordered Prior to Encounter[1]    Past Surgical History:   Procedure Laterality Date    AUGMENTATION OF BREAST Bilateral     15 years ago    BREAST BIOPSY Left     benign excisional biopsy 6 years ago    BREAST RECONSTRUCTION Bilateral     15 YEARS AGO.  reduction and lift       Social History:  Tobacco Use: Low Risk  (4/14/2025)    Patient History     Smoking Tobacco Use: Never     Smokeless Tobacco Use: Never     Passive Exposure: Not on file       Alcohol Use: Not At Risk (3/11/2025)    AUDIT-C     Frequency of Alcohol Consumption: Never     Average Number of Drinks: Patient does not drink     Frequency of Binge Drinking: Never       Objective    Vital Signs Range:  BMI Readings from Last 1 Encounters:   04/14/25 30.50 kg/m²               Significant Labs:        Component Value Date/Time    WBC 5.57 02/12/2025 0917    HGB 14.8 02/12/2025 0917    HCT 47.1 02/12/2025 0917     02/12/2025 0917     02/12/2025 0917    K 4.0 02/12/2025 0917     02/12/2025 0917    CO2 23 02/12/2025 0917    GLU 85 02/12/2025 0917    BUN 14 02/12/2025 0917    CREATININE 0.7 02/12/2025 0917    CALCIUM 9.5 02/12/2025 0917    ALBUMIN 4.0 02/12/2025 0917    PROT 7.4 02/12/2025 0917    ALKPHOS 75 02/12/2025 0917    BILITOT 0.3 02/12/2025 0917     AST 16 02/12/2025 0917    ALT 17 02/12/2025 0917    HGBA1C 5.5 02/12/2025 0917        Please see Results Review for additional labs.     Diagnostic Studies: All relevant studies, reviewed.    EKG:   No results found for this or any previous visit.    ECHO:  No results found for this or any previous visit.        Pre-op Assessment    I have reviewed the NPO Status.   I have reviewed the Medications.     Review of Systems  Anesthesia Hx:  No problems with previous Anesthesia   History of prior surgery of interest to airway management or planning:            Denies Personal Hx of Anesthesia complications.                    Social:  Non-Smoker, No Alcohol Use       EENT/Dental:  EENT/Dental Normal           Cardiovascular:      Denies Hypertension.       Denies Angina.                                    Pulmonary:       Denies Shortness of breath.                  Renal/:   Denies Chronic Renal Disease.                Hepatic/GI:  Hepatic/GI Normal                    Neurological:    Denies CVA.                                    Endocrine:  Endocrine Normal            Psych:  Psychiatric Normal                    Physical Exam  General: Well nourished, Cooperative, Alert and Oriented    Airway:  Mallampati: II   Mouth Opening: Normal  TM Distance: Normal  Tongue: Normal  Neck ROM: Normal ROM    Dental:  Intact        Anesthesia Plan  Type of Anesthesia, risks & benefits discussed:    Anesthesia Type: Gen ETT  Intra-op Monitoring Plan: Standard ASA Monitors  Post Op Pain Control Plan: multimodal analgesia and IV/PO Opioids PRN  Induction:  IV  Airway Plan: Direct and Video, Post-Induction  Informed Consent: Informed consent signed with the Patient and all parties understand the risks and agree with anesthesia plan.  All questions answered.   ASA Score: 2  Day of Surgery Review of History & Physical: H&P Update referred to the surgeon/provider.    Ready For Surgery From Anesthesia Perspective.     .           [1]    No current facility-administered medications on file prior to encounter.     No current outpatient medications on file prior to encounter.

## 2025-05-15 ENCOUNTER — ANESTHESIA (OUTPATIENT)
Dept: SURGERY | Facility: HOSPITAL | Age: 50
End: 2025-05-15
Payer: COMMERCIAL

## 2025-05-15 ENCOUNTER — HOSPITAL ENCOUNTER (OUTPATIENT)
Facility: HOSPITAL | Age: 50
Discharge: HOME | End: 2025-05-15
Attending: SURGERY | Admitting: SURGERY
Payer: COMMERCIAL

## 2025-05-15 VITALS
HEART RATE: 66 BPM | WEIGHT: 171.94 LBS | HEIGHT: 63 IN | BODY MASS INDEX: 30.46 KG/M2 | RESPIRATION RATE: 15 BRPM | TEMPERATURE: 98 F | OXYGEN SATURATION: 100 % | SYSTOLIC BLOOD PRESSURE: 108 MMHG | DIASTOLIC BLOOD PRESSURE: 67 MMHG

## 2025-05-15 DIAGNOSIS — N60.82 FLAT EPITHELIAL ATYPIA OF BREAST, LEFT: ICD-10-CM

## 2025-05-15 LAB
B-HCG UR QL: NEGATIVE
CTP QC/QA: YES

## 2025-05-15 PROCEDURE — 63600175 PHARM REV CODE 636 W HCPCS

## 2025-05-15 PROCEDURE — 19316 MASTOPEXY: CPT | Mod: 50,,, | Performed by: SURGERY

## 2025-05-15 PROCEDURE — 36000707: Performed by: SURGERY

## 2025-05-15 PROCEDURE — 19370 REVJ PERI-IMPLT CAPSULE BRST: CPT | Mod: 51,XS,RT, | Performed by: SURGERY

## 2025-05-15 PROCEDURE — 25000003 PHARM REV CODE 250

## 2025-05-15 PROCEDURE — 25000003 PHARM REV CODE 250: Performed by: SURGERY

## 2025-05-15 PROCEDURE — 19371 PERI-IMPLT CAPSLC BRST COMPL: CPT | Mod: 51,LT,, | Performed by: SURGERY

## 2025-05-15 PROCEDURE — 37000009 HC ANESTHESIA EA ADD 15 MINS: Performed by: SURGERY

## 2025-05-15 PROCEDURE — 27201423 OPTIME MED/SURG SUP & DEVICES STERILE SUPPLY: Performed by: SURGERY

## 2025-05-15 PROCEDURE — 88305 TISSUE EXAM BY PATHOLOGIST: CPT | Mod: TC,91 | Performed by: SURGERY

## 2025-05-15 PROCEDURE — 81025 URINE PREGNANCY TEST: CPT | Performed by: SURGERY

## 2025-05-15 PROCEDURE — 63600175 PHARM REV CODE 636 W HCPCS: Performed by: ANESTHESIOLOGY

## 2025-05-15 PROCEDURE — 71000016 HC POSTOP RECOV ADDL HR: Performed by: SURGERY

## 2025-05-15 PROCEDURE — 37000008 HC ANESTHESIA 1ST 15 MINUTES: Performed by: SURGERY

## 2025-05-15 PROCEDURE — 25000003 PHARM REV CODE 250: Performed by: STUDENT IN AN ORGANIZED HEALTH CARE EDUCATION/TRAINING PROGRAM

## 2025-05-15 PROCEDURE — 64461 PVB THORACIC SINGLE INJ SITE: CPT

## 2025-05-15 PROCEDURE — 36000706: Performed by: SURGERY

## 2025-05-15 PROCEDURE — 71000015 HC POSTOP RECOV 1ST HR: Performed by: SURGERY

## 2025-05-15 PROCEDURE — 76942 ECHO GUIDE FOR BIOPSY: CPT

## 2025-05-15 PROCEDURE — 64468 THRC FASCIAL PLN BLK BI NJX: CPT | Mod: 59,,, | Performed by: ANESTHESIOLOGY

## 2025-05-15 PROCEDURE — 63600175 PHARM REV CODE 636 W HCPCS: Performed by: STUDENT IN AN ORGANIZED HEALTH CARE EDUCATION/TRAINING PROGRAM

## 2025-05-15 PROCEDURE — 71000044 HC DOSC ROUTINE RECOVERY FIRST HOUR: Performed by: SURGERY

## 2025-05-15 PROCEDURE — 19125 EXCISION BREAST LESION: CPT | Mod: LT,,, | Performed by: SURGERY

## 2025-05-15 RX ORDER — SODIUM CHLORIDE 9 MG/ML
INJECTION, SOLUTION INTRAVENOUS CONTINUOUS
Status: DISCONTINUED | OUTPATIENT
Start: 2025-05-15 | End: 2025-05-15 | Stop reason: HOSPADM

## 2025-05-15 RX ORDER — HEPARIN SODIUM 5000 [USP'U]/ML
5000 INJECTION, SOLUTION INTRAVENOUS; SUBCUTANEOUS ONCE
Status: COMPLETED | OUTPATIENT
Start: 2025-05-15 | End: 2025-05-15

## 2025-05-15 RX ORDER — DEXAMETHASONE SODIUM PHOSPHATE 4 MG/ML
INJECTION, SOLUTION INTRA-ARTICULAR; INTRALESIONAL; INTRAMUSCULAR; INTRAVENOUS; SOFT TISSUE
Status: DISCONTINUED | OUTPATIENT
Start: 2025-05-15 | End: 2025-05-15

## 2025-05-15 RX ORDER — ROCURONIUM BROMIDE 10 MG/ML
INJECTION, SOLUTION INTRAVENOUS
Status: DISCONTINUED | OUTPATIENT
Start: 2025-05-15 | End: 2025-05-15

## 2025-05-15 RX ORDER — PHENYLEPHRINE HCL IN 0.9% NACL 1 MG/10 ML
SYRINGE (ML) INTRAVENOUS
Status: DISCONTINUED | OUTPATIENT
Start: 2025-05-15 | End: 2025-05-15

## 2025-05-15 RX ORDER — ONDANSETRON HYDROCHLORIDE 2 MG/ML
INJECTION, SOLUTION INTRAVENOUS
Status: DISCONTINUED | OUTPATIENT
Start: 2025-05-15 | End: 2025-05-15

## 2025-05-15 RX ORDER — SCOPOLAMINE 1 MG/3D
1 PATCH, EXTENDED RELEASE TRANSDERMAL
Status: DISCONTINUED | OUTPATIENT
Start: 2025-05-15 | End: 2025-05-15 | Stop reason: HOSPADM

## 2025-05-15 RX ORDER — TRAMADOL HYDROCHLORIDE 50 MG/1
50 TABLET, FILM COATED ORAL EVERY 6 HOURS
Qty: 14 TABLET | Refills: 0 | Status: SHIPPED | OUTPATIENT
Start: 2025-05-15

## 2025-05-15 RX ORDER — HALOPERIDOL LACTATE 5 MG/ML
0.5 INJECTION, SOLUTION INTRAMUSCULAR EVERY 10 MIN PRN
Status: DISCONTINUED | OUTPATIENT
Start: 2025-05-15 | End: 2025-05-15 | Stop reason: HOSPADM

## 2025-05-15 RX ORDER — ONDANSETRON 4 MG/1
8 TABLET, ORALLY DISINTEGRATING ORAL 2 TIMES DAILY
Qty: 14 TABLET | Refills: 0 | Status: SHIPPED | OUTPATIENT
Start: 2025-05-15

## 2025-05-15 RX ORDER — PROCHLORPERAZINE EDISYLATE 5 MG/ML
INJECTION INTRAMUSCULAR; INTRAVENOUS
Status: DISCONTINUED | OUTPATIENT
Start: 2025-05-15 | End: 2025-05-15

## 2025-05-15 RX ORDER — GLUCAGON 1 MG
1 KIT INJECTION
Status: DISCONTINUED | OUTPATIENT
Start: 2025-05-15 | End: 2025-05-15 | Stop reason: HOSPADM

## 2025-05-15 RX ORDER — MIDAZOLAM HYDROCHLORIDE 1 MG/ML
.5-4 INJECTION, SOLUTION INTRAMUSCULAR; INTRAVENOUS
Status: DISCONTINUED | OUTPATIENT
Start: 2025-05-15 | End: 2025-05-15 | Stop reason: HOSPADM

## 2025-05-15 RX ORDER — HYDROMORPHONE HYDROCHLORIDE 1 MG/ML
0.2 INJECTION, SOLUTION INTRAMUSCULAR; INTRAVENOUS; SUBCUTANEOUS EVERY 5 MIN PRN
Status: DISCONTINUED | OUTPATIENT
Start: 2025-05-15 | End: 2025-05-15 | Stop reason: HOSPADM

## 2025-05-15 RX ORDER — NITROGLYCERIN 20 MG/G
OINTMENT TOPICAL
Status: DISCONTINUED | OUTPATIENT
Start: 2025-05-15 | End: 2025-05-15 | Stop reason: HOSPADM

## 2025-05-15 RX ORDER — LIDOCAINE HYDROCHLORIDE 20 MG/ML
INJECTION, SOLUTION EPIDURAL; INFILTRATION; INTRACAUDAL; PERINEURAL
Status: DISCONTINUED | OUTPATIENT
Start: 2025-05-15 | End: 2025-05-15

## 2025-05-15 RX ORDER — CEFAZOLIN 2 G/1
2 INJECTION, POWDER, FOR SOLUTION INTRAMUSCULAR; INTRAVENOUS
Status: DISCONTINUED | OUTPATIENT
Start: 2025-05-15 | End: 2025-05-15 | Stop reason: HOSPADM

## 2025-05-15 RX ORDER — ACETAMINOPHEN 10 MG/ML
INJECTION, SOLUTION INTRAVENOUS
Status: DISCONTINUED | OUTPATIENT
Start: 2025-05-15 | End: 2025-05-15

## 2025-05-15 RX ORDER — FENTANYL CITRATE 50 UG/ML
25-200 INJECTION, SOLUTION INTRAMUSCULAR; INTRAVENOUS
Status: DISCONTINUED | OUTPATIENT
Start: 2025-05-15 | End: 2025-05-15 | Stop reason: HOSPADM

## 2025-05-15 RX ORDER — KETAMINE HCL IN 0.9 % NACL 50 MG/5 ML
SYRINGE (ML) INTRAVENOUS
Status: DISCONTINUED | OUTPATIENT
Start: 2025-05-15 | End: 2025-05-15

## 2025-05-15 RX ORDER — KETOROLAC TROMETHAMINE 30 MG/ML
INJECTION, SOLUTION INTRAMUSCULAR; INTRAVENOUS
Status: DISCONTINUED | OUTPATIENT
Start: 2025-05-15 | End: 2025-05-15

## 2025-05-15 RX ORDER — BUPIVACAINE HYDROCHLORIDE 7.5 MG/ML
INJECTION, SOLUTION EPIDURAL; RETROBULBAR
Status: COMPLETED | OUTPATIENT
Start: 2025-05-15 | End: 2025-05-15

## 2025-05-15 RX ORDER — PROPOFOL 10 MG/ML
VIAL (ML) INTRAVENOUS
Status: DISCONTINUED | OUTPATIENT
Start: 2025-05-15 | End: 2025-05-15

## 2025-05-15 RX ORDER — DEXMEDETOMIDINE HYDROCHLORIDE 100 UG/ML
INJECTION, SOLUTION INTRAVENOUS
Status: DISCONTINUED | OUTPATIENT
Start: 2025-05-15 | End: 2025-05-15

## 2025-05-15 RX ORDER — CEFAZOLIN SODIUM 1 G/3ML
INJECTION, POWDER, FOR SOLUTION INTRAMUSCULAR; INTRAVENOUS
Status: DISCONTINUED | OUTPATIENT
Start: 2025-05-15 | End: 2025-05-15

## 2025-05-15 RX ORDER — SODIUM CHLORIDE 0.9 % (FLUSH) 0.9 %
10 SYRINGE (ML) INJECTION
Status: DISCONTINUED | OUTPATIENT
Start: 2025-05-15 | End: 2025-05-15 | Stop reason: HOSPADM

## 2025-05-15 RX ADMIN — ONDANSETRON 4 MG: 2 INJECTION INTRAMUSCULAR; INTRAVENOUS at 09:05

## 2025-05-15 RX ADMIN — ACETAMINOPHEN 1000 MG: 10 INJECTION, SOLUTION INTRAVENOUS at 07:05

## 2025-05-15 RX ADMIN — PROCHLORPERAZINE EDISYLATE 2.5 MG: 5 INJECTION INTRAMUSCULAR; INTRAVENOUS at 09:05

## 2025-05-15 RX ADMIN — ROCURONIUM BROMIDE 10 MG: 10 INJECTION INTRAVENOUS at 09:05

## 2025-05-15 RX ADMIN — Medication 15 MG: at 07:05

## 2025-05-15 RX ADMIN — Medication 100 MCG: at 09:05

## 2025-05-15 RX ADMIN — HYDROMORPHONE HYDROCHLORIDE 0.2 MG: 1 INJECTION, SOLUTION INTRAMUSCULAR; INTRAVENOUS; SUBCUTANEOUS at 10:05

## 2025-05-15 RX ADMIN — SCOLOPAMINE TRANSDERMAL SYSTEM 1 PATCH: 1 PATCH, EXTENDED RELEASE TRANSDERMAL at 06:05

## 2025-05-15 RX ADMIN — MIDAZOLAM 2 MG: 1 INJECTION INTRAMUSCULAR; INTRAVENOUS at 06:05

## 2025-05-15 RX ADMIN — SODIUM CHLORIDE: 0.9 INJECTION, SOLUTION INTRAVENOUS at 07:05

## 2025-05-15 RX ADMIN — ROCURONIUM BROMIDE 10 MG: 10 INJECTION INTRAVENOUS at 08:05

## 2025-05-15 RX ADMIN — Medication 100 MCG: at 08:05

## 2025-05-15 RX ADMIN — PROPOFOL 150 MG: 10 INJECTION, EMULSION INTRAVENOUS at 07:05

## 2025-05-15 RX ADMIN — DEXMEDETOMIDINE 12 MCG: 200 INJECTION, SOLUTION INTRAVENOUS at 09:05

## 2025-05-15 RX ADMIN — ROCURONIUM BROMIDE 50 MG: 10 INJECTION INTRAVENOUS at 07:05

## 2025-05-15 RX ADMIN — BUPIVACAINE HYDROCHLORIDE 30 ML: 7.5 INJECTION, SOLUTION EPIDURAL; RETROBULBAR at 06:05

## 2025-05-15 RX ADMIN — SODIUM CHLORIDE: 0.9 INJECTION, SOLUTION INTRAVENOUS at 06:05

## 2025-05-15 RX ADMIN — SODIUM CHLORIDE, SODIUM ACETATE ANHYDROUS, SODIUM GLUCONATE, POTASSIUM CHLORIDE, AND MAGNESIUM CHLORIDE: 526; 222; 502; 37; 30 INJECTION, SOLUTION INTRAVENOUS at 08:05

## 2025-05-15 RX ADMIN — CEFAZOLIN 2 G: 330 INJECTION, POWDER, FOR SOLUTION INTRAMUSCULAR; INTRAVENOUS at 07:05

## 2025-05-15 RX ADMIN — SUGAMMADEX 200 MG: 100 INJECTION, SOLUTION INTRAVENOUS at 10:05

## 2025-05-15 RX ADMIN — Medication 10 MG: at 08:05

## 2025-05-15 RX ADMIN — DEXAMETHASONE SODIUM PHOSPHATE 4 MG: 4 INJECTION INTRA-ARTICULAR; INTRALESIONAL; INTRAMUSCULAR; INTRAVENOUS; SOFT TISSUE at 07:05

## 2025-05-15 RX ADMIN — LIDOCAINE HYDROCHLORIDE 70 MG: 20 INJECTION, SOLUTION EPIDURAL; INFILTRATION; INTRACAUDAL at 07:05

## 2025-05-15 RX ADMIN — KETOROLAC TROMETHAMINE 30 MG: 30 INJECTION, SOLUTION INTRAMUSCULAR; INTRAVENOUS at 09:05

## 2025-05-15 RX ADMIN — ROCURONIUM BROMIDE 10 MG: 10 INJECTION INTRAVENOUS at 07:05

## 2025-05-15 RX ADMIN — Medication 15 MG: at 09:05

## 2025-05-15 RX ADMIN — FENTANYL CITRATE 50 MCG: 50 INJECTION INTRAMUSCULAR; INTRAVENOUS at 06:05

## 2025-05-15 RX ADMIN — HEPARIN SODIUM 5000 UNITS: 5000 INJECTION INTRAVENOUS; SUBCUTANEOUS at 06:05

## 2025-05-15 NOTE — OP NOTE
Plastic, Reconstructive, and Hand Surgery  Operative Note     Patient:  Otilia Meier   MRN:  [id number]   YOB: 1975     Date of Surgery: 5/15/2025     Preoperative Diagnosis:   Left breast atypical ductal hyperplasia  Status post bilateral breast reduction with subpectoral implant placement       Postoperative Diagnosis: Same     Procedure Performed:   Left oncoplastic reduction  Right breast reduction for symmetry  Removal bilateral intact breast implants  Right partial capsulectomy  Left total capsulectomy     Attending Surgeon: Chayo Cagle MD      Surgical Team: Surgeons and Role:  Panel 1:     * Francisco J Harris MD - Primary     * Daisy Castillo MD - Resident - Assisting  Panel 2:     * Chayo Cagle MD - Primary     Anesthesia: General endotracheal with erector spinae blocks     Key Findings:  Good symmetry after reduction and implant removal. Right breast 818 g, left breast 634 grams     Estimated Blood Loss: 25 mL     Drains:  none     Implants:   Implant Name Type Inv. Item Serial No.  Lot No. LRB No. Used Action   BREAST Breast      1 Explanted       Complications:  none     Specimens:    Additional left breast tissue and skin   Right breast tissue and skin   Left breast implant and capsule   Right breast implant and capsule     Indications for Procedure:  Otilia Meier is a 50 y.o. female who presented to my clinic 1 month ago with recent diagnosis of left breast atypia.  She has history of breast reduction and mastopexy with subpectoral implant placement approximately 15 years ago.  She has been uncomfortable with her larger breasts and desire to be smaller.  After meeting with the breast surgeon, she is due to have lumpectomy.  I recommended implant removal, left oncoplastic reduction and right breast reduction for symmetry. Risks and rationale for this procedure were explained to her at length and she agreed to proceed.     Procedure in Detail:   The patient was seen in the preoperative holding area and marked in the upright position.  Bilateral erector spinae block was performed in the preoperative holding area.  Upon entering the room the patient was under general endotracheal anesthesia under the care of the breast surgery service.  Please refer to their op note for further details.  A time out procedure was then performed, where the patient was properly identified, and there was verification of the procedure to be performed as well as administration of appropriate pre-operative antibiotics.       The markings were confirmed on the patient bilaterally.  The right side was operated on first and upon completion, the left side followed in series.  A 42 mm cookie cutter was utilized to scooby and reduce the nipple-areolar complex.  A 15. Blade scalpel then used to incise along the reduced nipple areola.  The remaining incisions were then made using 10. Blade scalpel.  The 8 cm superomedial pedicle was then de-epithelialized using Super cut Nielsen scissors.  Dissection was then made along the previous markings in the lower pole of the breast.  Minimal excision was carried out medially, with the majority of the reduction happening in the lateral and inferior portions.  The superomedial pedicle was minimally thinned out without compromise of the vasculature to the nipple-areolar complex.  The implant was encountered to be in a subpectoral plane.  This appeared to have bottomed out and was sitting lateral on the chest wall.  The thinnest portion of the capsule was then opened and the implant was easily removed from the right side with some loose capsule coming with it.  There was no gross pathology noted within the right subpectoral pocket.  On the left side, the implant appeared flipped and with evidence of capsular contracture.  Total capsulectomy was carried out on this side in addition to implant removal.  These were both sent off as specimen.  Of note, these  were McGhan textured implants.  There was no other gross pathology noted within the subpectoral pockets.  Meticulous hemostasis was achieved.  The capsule and pectoralis muscle was then advanced down towards the chest wall and secured using 2-0 Vicryl suture in interrupted figure-of-eight fashion.  Happy with the overall amount reduced as well as overall contour of the breast remaining proportional to her body, meticulous hemostasis was achieved using Bovie electrocautery.  On the left side, the lumpectomy was performed within the lateral excision area.  The wound was then irrigated copiously until the effluent was clear.  Hemostasis remained excellent.  Quilting sutures were placed laterally and medially to reapproximate Carley's fascia with 2-0 Vicryl.  This helped to reshaped the lateral breast fold as well as to obliterate the potential space.  The superior medial pedicle was then rotated cephalad. Minimal incremental back cuts on the inferior medial dermis was carried out until there was no tension whatsoever.  Artiss was sprayed into the potential space.  The horizontal and vertical incisions were then reapproximated using 3-0 Monocryl suture in deep dermal fashion followed by 3-0 Monocryl suture in running subcuticular fashion.  The 38 mm cookie cutter was then used to scooby out the new position of the nipple-areolar complex which was at the apex of the breast mound.  This was incised using 15. Blade scalpel.  Dissection was carried down through the subcuticular tissue and into the breast parenchyma.  Additional tissue was added to the total specimen.  The nipple-areolar complex was able to be delivered through the keyhole.  It was noted to be pink and viable without any concern for ischemia or congestion on the left, and with a very minimal congestion of the right.  It was able to be gently inset without tension and this was done so using a 4-0 Monocryl suture in deep dermal fashion followed by 5-0 Monocryl  suture in running subcuticular fashion.  Total excised weight from the right breast was 818 g.  Total excised weight from the left breast was 634 g.     Needle, instrument, and sponge count was correct at this time.  Sterile dressings were applied, consisting of paper tape, fluffs, ABD pad, and surgical prep.  Nitro paste was applied to the right nipple-areolar complex.  Patient tolerated the procedure in its entirety without complication and successfully aroused from anesthesia.  The patient was transferred to the recovery room in stable condition and is to be discharged home.  I reviewed the intra-op findings with her daughter via telephone.  All questions were answered.     Post-operative Plan:  Stable, to PACU.    Discharge to home when criteria met.    Follow up:  1-2 weeks  Restrictions:  Light activity     Attestation of Presence:  I was present for the entirety of the procedure.     Chayo Cagle MD  05/15/2025 10:34 AM

## 2025-05-15 NOTE — H&P
Plastic & Reconstructive Surgery    Breast Reconstruction Consult     Patient: Otilia Meier  MRN: [id number]  : 1975  Date of Service: 5/15/2025  PCP: Kelly Bo MD  Referring Provider:  Francisco J Harris MD     Chief Complaint: implant removal / L breast atypia     History of Present Illness:  Otilia Meier is a 50 y.o. female who presents with recent diagnosis of left breast atypia.      S/p reduction mastopexy / aug 15 yrs ago.   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 Denies recents changes to their medical history or medications. NPO since yesterday. Denies recent anticoagulation use. They have no further questions at this time and would like to proceed with surgery.       History reviewed. No pertinent past medical history.     Past Surgical History:   Procedure Laterality Date    AUGMENTATION OF BREAST Bilateral     15 years ago    BREAST BIOPSY Left     benign excisional biopsy 6 years ago    BREAST RECONSTRUCTION Bilateral     15 YEARS AGO.  reduction and lift        Family History   Problem Relation Name Age of Onset    Alcohol abuse Mother      Alcohol abuse Father      Brain aneurysm Maternal Grandmother      Heart disease Paternal Grandfather          MI        Social History     Socioeconomic History    Marital status:    Tobacco Use    Smoking status: Never    Smokeless tobacco: Never   Substance and Sexual Activity    Alcohol use: Not Currently    Drug use: Never    Sexual activity:  Not Currently     Social Drivers of Health     Financial Resource Strain: Low Risk  (3/11/2025)    Overall Financial Resource Strain (CARDIA)     Difficulty of Paying Living Expenses: Not hard at all   Food Insecurity: No Food Insecurity (3/11/2025)    Hunger Vital Sign     Worried About Running Out of Food in the Last Year: Never true     Ran Out of Food in the Last Year: Never true   Transportation Needs: No Transportation Needs (3/11/2025)    PRAPARE - Transportation     Lack of Transportation (Medical): No     Lack of Transportation (Non-Medical): No   Physical Activity: Sufficiently Active (3/11/2025)    Exercise Vital Sign     Days of Exercise per Week: 2 days     Minutes of Exercise per Session: 120 min   Stress: Stress Concern Present (3/11/2025)    [employer] - Occupational Stress Questionnaire     Feeling of Stress : To some extent   Housing Stability: Low Risk  (3/11/2025)    Housing Stability Vital Sign     Unable to Pay for Housing in the Last Year: No     Number of Times Moved in the Last Year: 0     Homeless in the Last Year: No         Review of patient's allergies indicates:   Allergen Reactions    Hydrocodone-acetaminophen Other (See Comments)     vomiting        Medications Ordered Prior to Encounter[1]     Review of Systems:  Negative as per HPI     Physical Exam:  Vitals:    05/15/25 0556   BP:    Pulse: 69   Resp:    Temp:      Gen: NAD, A&O x3  Pulm: unlabored  CV: regular rate  Breast:  large breasts with pseudoptosis noted; implants soft and supple, no evidence of contracture; periareolar incisions noted, widened NAC; Nipple sensation intact, though increased sensation on right. Min TTP; slight stigmata of rash noted;   Measurements below in cm:      Right Breast        Left Breast  SN to Nipple:  25   24  Nipple to IMF:  18.5   18.5  Base Diameter: 15   15  Subaxillary Rolls: moderate       moderate     Labs / Imaging / Biopsies:  reviewed    Assessment and  Plan:  Otilia Meier is a 50 y.o. female who presents with Left breast atypia.      I had a lengthy discussion with the patient.  She has a newly diagnosed breast atypia.  I explained the my role is to help reconstruct the breast with the atypia as well as to provide symmetry to the contralateral side.  I explained that her reconstructive options include no surgery, implant based reconstruction, both immediate and delayed, as well as autologous tissue reconstruction.  I also reviewed the options regarding her nipple-areolar complex, should this be necessary based on her treatment.  She verbalizes understanding of this.     I explained the effects that chemotherapy and radiation may have on her reconstruction.  I also discussed how radiation will have an effect on her tissue, as well as an implant, if used.  We discussed how this may limit certain types of reconstruction or require additional procedures.  If she desires implant reconstruction and is a good candidate for this, my preference is to perform this prior to radiation therapy.  If she desires autologous reconstruction, my preference is to perform this after radiation therapy.  She verbalizes understanding of this.       Based on her specific diagnosis as well as her overall history, I recommend that she undergo bilateral breast implant removal, left oncoplastic mastopexy, right reduction mastopexy for symmetry.  Implant removal will help with long-term surveillance and imaging.     I described the risks and rationale of surgical treatment, including, but not limited to, bleeding, infection, pain, scarring, hematoma, seroma, asymmetry, wound dehiscence, delayed wound healing, change of nipple sensation, loss of nipple areola complex, and need for further surgery.  All questions were answered.  The patient verbalized understanding and wished to proceed.      We will plan for bilateral breast implant removal, left oncoplastic mastopexy, right reduction  mastopexy for symmetry.  We will coordinate with her breast surgeon and schedule appropriately.  Patient may call back with any questions or concerns in the interim.      - To OR today, consent obtained     Luther Finch MD  General Surgery PGY-1         [1]   No current facility-administered medications on file prior to encounter.     No current outpatient medications on file prior to encounter.      -at goal, less than 130/80 with hctz and lisinopril.

## 2025-05-15 NOTE — PLAN OF CARE
Patient being discharged to home via wheelchair. Pt alert and talkative, vitals stable on room air, tolerating PO intake. Discharge instructions (written and verbal) and follow-up information given to patient who verbalized understanding, as well as a readiness for discharge. C contact info provided for additional questions following discharge.

## 2025-05-15 NOTE — ANESTHESIA PROCEDURE NOTES
[facility] DYANA SS    Patient location during procedure: pre-op   Block not for primary anesthetic.  Reason for block: at surgeon's request and post-op pain management   Post-op Pain Location: Breast pain   Start time: 5/15/2025 6:53 AM  Timeout: 5/15/2025 6:53 AM   End time: 5/15/2025 6:58 AM    Staffing  Authorizing Provider: Ashley Juarez MD  Performing Provider: Melchor Bardales MD    Staffing  Performed by: Melchor Bardales MD  Authorized by: Ashley Juarez MD    Preanesthetic Checklist  Completed: patient identified, IV checked, site marked, risks and benefits discussed, surgical consent, monitors and equipment checked, pre-op evaluation and timeout performed  Peripheral Block  Patient position: sitting  Prep: ChloraPrep  Patient monitoring: heart rate, cardiac monitor, continuous pulse ox, continuous capnometry and frequent blood pressure checks  Block type: erector spinae plane (Erector Spinae Plane)  Laterality: bilateral  Injection technique: single shot  Interspace: T4-5    Needle  Needle type: Tuohy   Needle gauge: 17 G  Needle length: 3.5 in  Needle localization: anatomical landmarks and ultrasound guidance  Catheter type: spring wound  Catheter size: 19 G  Test dose: lidocaine 1.5% with Epi 1-to-200,000 and negative   -ultrasound image captured on disc.  Assessment  Injection assessment: negative aspiration, negative parasthesia and local visualized surrounding nerve  Paresthesia pain: none  Heart rate change: no  Slow fractionated injection: yes  Pain Tolerance: comfortable throughout block and no complaints  Medications:    Medications: bupivacaine (pf) (MARCAINE) injection 0.75% - Perineural   30 mL - 5/15/2025 6:55:00 AM    Additional Notes  Patient tolerated very well.  Vital signs stable.  [facility].

## 2025-05-15 NOTE — TRANSFER OF CARE
"Anesthesia Transfer of Care Note    Patient: Otilia Meier    Procedure(s) Performed: Procedure(s) (LRB):  LUMPECTOMY,BREAST,WITH RADIOACTIVE SEED LOCALIZATION, left breast (Left)  REMOVAL, IMPLANT, BREAST (Bilateral)  MAMMOPLASTY, REDUCTION (Bilateral)    Patient location: [facility]    Anesthesia Type: general    Transport from OR: Transported from OR on 6-10 L/min O2 by face mask with adequate spontaneous ventilation    Post pain: adequate analgesia    Post assessment: no apparent anesthetic complications and tolerated procedure well    Post vital signs: stable    Level of consciousness: awake and alert    Nausea/Vomiting: no nausea/vomiting    Complications: none    Transfer of care protocol was followed      Last vitals: Visit Vitals  BP (!) 128/58 (BP Location: Left arm, Patient Position: Lying)   Pulse 93   Temp 36.8 °C (98.2 °F) (Temporal)   Resp 16   Ht 5' 3" (1.6 m)   Wt 78 kg (171 lb 15.3 oz)   LMP  (LMP Unknown)   SpO2 100%   Breastfeeding No   BMI 30.46 kg/m²     "

## 2025-05-15 NOTE — BRIEF OP NOTE
Brief Operative Note     SUMMARY     Surgery Date: 5/15/2025     Surgeons and Role:  Panel 1:     * Francisco J Harris MD - Primary     * Daisy Castillo MD - Resident - Assisting  Panel 2:     * Chayo Cagle MD - Primary        Pre-op Diagnosis:  Flat epithelial atypia of breast, left [N60.82]    Post-op Diagnosis:  Flat epithelial atypia of breast, left [N60.82]    Procedure(s) (LRB):  LUMPECTOMY,BREAST,WITH RADIOACTIVE SEED LOCALIZATION, left breast (Left)  REMOVAL, IMPLANT, BREAST (Bilateral)  MAMMOPLASTY, REDUCTION (Bilateral)    Anesthesia: General    Description of Procedure:   Bilateral implant removal  Right partial capsulectomy  Left total capsulectomy  Left breast oncologic reduction  Right breast reduction for symmetry     Findings/Key Components:  See op note    Estimated Blood Loss: less than 100 mL         Specimens Removed:   Specimen (24h ago, onward)       Start     Ordered    05/15/25 0805  Specimen to Pathology General Surgery  RELEASE UPON ORDERING        References:    Click here for ordering Quick Tip   Question:  Release to patient  Answer:  Immediate    05/15/25 0805                    Discharge Note      SUMMARY     Admit Date: 5/15/2025    Attending Physician: Francisco J Harris MD     Discharge Physician: Francisco J Harris MD    Discharge Date: 5/15/2025     Final Diagnosis: Flat epithelial atypia of breast, left [N60.82]    Hospital Course: Patient was admitted for an outpatient procedure and tolerated the procedure well with no complications.    Disposition: Home or Self Care    Follow Up/Patient Instructions:   Current Discharge Medication List        START taking these medications    Details   ondansetron (ZOFRAN-ODT) 4 MG TbDL Dissolve 2 tablets (8 mg total) by mouth 2 (two) times daily.  Qty: 14 tablet, Refills: 0      traMADoL (ULTRAM) 50 mg tablet Take 1 tablet (50 mg total) by mouth every 6 (six) hours.  Qty: 14 tablet, Refills: 0    Comments: Quantity prescribed  more than 7 day supply? No  Associated Diagnoses: Flat epithelial atypia of breast, left             Discharge Procedure Orders (must include Diet, Follow-up, Activity)   Discharge Procedure Orders (must include Diet, Follow-up, Activity)   Diet Adult Regular     Notify your health care provider if you experience any of the following:  temperature >100.4     Notify your health care provider if you experience any of the following:  persistent nausea and vomiting or diarrhea     Notify your health care provider if you experience any of the following:  severe uncontrolled pain     Notify your health care provider if you experience any of the following:  redness, tenderness, or signs of infection (pain, swelling, redness, odor or green/yellow discharge around incision site)     Notify your health care provider if you experience any of the following:  difficulty breathing or increased cough     Notify your health care provider if you experience any of the following:  severe persistent headache     Notify your health care provider if you experience any of the following:  worsening rash     Notify your health care provider if you experience any of the following:  persistent dizziness, light-headedness, or visual disturbances     Notify your health care provider if you experience any of the following:  increased confusion or weakness     Weight bearing restrictions (specify):   Order Comments: No heavy lifting

## 2025-05-15 NOTE — OP NOTE
Date of procedure - May 15, 2025  Preoperative diagnosis - left breast atypical ductal hyperplasia  Postoperative diagnosis - same  Procedure - left breast radar reflector localized lumpectomy  Surgeon - Kimberly  Assist - Jonathan  Anesthesia - general  Blood loss - minimal  Indications - this is a 50-year-old patient who on screening mammogram demonstrated to have an abnormality that was evaluated by core biopsy demonstrating atypical ductal hyperplasia  Excision is indicated to rule out coexisting malignancy  Operative report in detail - the patient was brought to the operating room placed in supine position prepped and draped in sterile fashion after satisfactory general anesthesia was induced  Bilateral implant removal was being planned and a Conklin type incision was to be utilized the lateral transverse portion of this incision was opened since this was directly over the most intense signal elicited with the PAUL  system  The skin and subcutaneous tissues were divided and then cautery was utilized to circumferentially excise the breast tissue in the lateral left breast  The PAUL  system was used to check and recheck the position of the reflector throughout the procedure to assure remained in a central location  The specimen was removed and oriented with a long lateral short superior stitch  A specimen radiograph demonstrated both the biopsy clip and the radar reflector in the central aspect of the specimen  Hemostasis was evaluated achieved and considered excellent and the case was turned over to Dr. Britt for the remainder of the procedure closure

## 2025-05-15 NOTE — ANESTHESIA PROCEDURE NOTES
Intubation    Date/Time: 5/15/2025 7:11 AM    Performed by: Daniele Allen DO  Authorized by: Faina Yuan MD    Intubation:     Induction:  Intravenous    Intubated:  Postinduction    Mask Ventilation:  Easy mask    Attempts:  1    Attempted By:  Staff anesthesiologist    Method of Intubation:  Direct    Blade:  Jose Carlos 3    Laryngeal View Grade: Grade I - full view of cords      Difficult Airway Encountered?: No      Complications:  None    Airway Device:  Oral endotracheal tube    Airway Device Size:  7.0    Style/Cuff Inflation:  Cuffed (inflated to minimal occlusive pressure)    Tube secured:  25    Secured at:  The lips    Placement Verified By:  Capnometry    Complicating Factors:  None    Findings Post-Intubation:  BS equal bilateral and atraumatic/condition of teeth unchanged

## 2025-05-16 NOTE — ANESTHESIA POSTPROCEDURE EVALUATION
Anesthesia Post Evaluation    Patient: Otilia Meier    Procedure(s) Performed: Procedure(s) (LRB):  LUMPECTOMY,BREAST,WITH RADIOACTIVE SEED LOCALIZATION, left breast (Left)  REMOVAL, IMPLANT, BREAST (Bilateral)  MAMMOPLASTY, REDUCTION (Bilateral)    Final Anesthesia Type: general      Patient location during evaluation: PACU  Patient participation: Yes- Able to Participate  Level of consciousness: awake and alert and oriented  Post-procedure vital signs: reviewed and stable  Pain management: adequate  Airway patency: patent    PONV status at discharge: No PONV  Anesthetic complications: no      Cardiovascular status: blood pressure returned to baseline and hemodynamically stable  Respiratory status: unassisted and spontaneous ventilation  Hydration status: euvolemic  Follow-up not needed.              Vitals Value Taken Time   /67 05/15/25 12:02   Temp 36.5 °C (97.7 °F) 05/15/25 10:20   Pulse 76 05/15/25 12:07   Resp 15 05/15/25 12:06   SpO2 100 % 05/15/25 12:07   Vitals shown include unfiled device data.      No case tracking events are documented in the log.      Pain/Kody Score: Pain Rating Prior to Med Admin: 7 (5/15/2025 10:52 AM)  Kody Score: 10 (5/15/2025 11:00 AM)

## 2025-05-20 LAB
ESTROGEN SERPL-MCNC: NORMAL PG/ML
INSULIN SERPL-ACNC: NORMAL U[IU]/ML
LAB AP CLINICAL INFORMATION: NORMAL
LAB AP GROSS DESCRIPTION: NORMAL
LAB AP PERFORMING LOCATION(S): NORMAL
LAB AP REPORT FOOTNOTES: NORMAL

## 2025-05-22 ENCOUNTER — OFFICE VISIT (OUTPATIENT)
Dept: PLASTIC SURGERY | Facility: CLINIC | Age: 50
End: 2025-05-22
Payer: COMMERCIAL

## 2025-05-22 DIAGNOSIS — N60.82 FLAT EPITHELIAL ATYPIA (FEA) OF LEFT BREAST: Primary | ICD-10-CM

## 2025-05-22 PROCEDURE — 99999 PR PBB SHADOW E&M-EST. PATIENT-LVL III: CPT | Mod: PBBFAC,,, | Performed by: SURGERY

## 2025-05-27 ENCOUNTER — OFFICE VISIT (OUTPATIENT)
Dept: PLASTIC SURGERY | Facility: CLINIC | Age: 50
End: 2025-05-27
Payer: COMMERCIAL

## 2025-05-27 DIAGNOSIS — N60.82 FLAT EPITHELIAL ATYPIA (FEA) OF LEFT BREAST: Primary | ICD-10-CM

## 2025-05-27 PROCEDURE — 3044F HG A1C LEVEL LT 7.0%: CPT | Mod: CPTII,S$GLB,, | Performed by: SURGERY

## 2025-05-27 PROCEDURE — 1159F MED LIST DOCD IN RCRD: CPT | Mod: CPTII,S$GLB,, | Performed by: SURGERY

## 2025-05-27 PROCEDURE — 99024 POSTOP FOLLOW-UP VISIT: CPT | Mod: S$GLB,,, | Performed by: SURGERY

## 2025-05-27 PROCEDURE — 99999 PR PBB SHADOW E&M-EST. PATIENT-LVL III: CPT | Mod: PBBFAC,,, | Performed by: SURGERY

## 2025-05-27 PROCEDURE — 1160F RVW MEDS BY RX/DR IN RCRD: CPT | Mod: CPTII,S$GLB,, | Performed by: SURGERY

## 2025-05-27 NOTE — PROGRESS NOTES
Plastic & Reconstructive Surgery  Progress Note     S:  pt last seen 5 days ago.  She is now 12 days s/p remov B Breast implants, capsulectomy, left oncoplastic reduction and right reduction for symmetry.      Overall doing ok.   No pain  No f/c  No drain  R NAC having persistent scab.      O: LMP  (LMP Unknown) Comment: hasn't had a cycle in years   Gen:  NAD, A&O x3  Breasts: incision c/d/I, no erythema / exudate. R NAC with eschar. Slight dog ears noted medially. No hematoma / seroma noted.      A/P: 50 y.o. female 12 days s/p remov B Breast implants, capsulectomy, left oncoplastic reduction and right reduction for symmetry.      Overall ok.   Incisions are healing well though her right nipple areolar complexes developing an eschar.    We will allow this to declare.    Continue applying Vaseline or Aquaphor.    Continue to monitor the medial aspect of the incisions and she will likely need revision in the future.    All questions answered. Patient verbalizes understanding and agreement with treatment plan.       Follow up:  2 wks  Restriction: increase as izabela     I spent 15 minutes on this patient encounter which included review of medical records.  Over half the time was spent with the patient face to face discussing the treatment plan, counseling and coordinating care.     Chayo Cagle MD  05/27/2025 11:26 AM     This document was transcribed using voice recognition software without a human . It may contain typographical, grammatical, and/or syntax errors.

## 2025-05-28 ENCOUNTER — OFFICE VISIT (OUTPATIENT)
Dept: SURGERY | Facility: CLINIC | Age: 50
End: 2025-05-28
Payer: COMMERCIAL

## 2025-05-28 VITALS
HEIGHT: 63 IN | WEIGHT: 162.69 LBS | OXYGEN SATURATION: 97 % | DIASTOLIC BLOOD PRESSURE: 83 MMHG | HEART RATE: 69 BPM | SYSTOLIC BLOOD PRESSURE: 131 MMHG | BODY MASS INDEX: 28.82 KG/M2

## 2025-05-28 DIAGNOSIS — N60.99 ATYPICAL DUCTAL HYPERPLASIA OF BREAST: Primary | ICD-10-CM

## 2025-05-28 PROCEDURE — 99999 PR PBB SHADOW E&M-EST. PATIENT-LVL III: CPT | Mod: PBBFAC,,, | Performed by: SURGERY

## 2025-05-28 RX ORDER — ALBUTEROL SULFATE 90 UG/1
INHALANT RESPIRATORY (INHALATION)
COMMUNITY

## 2025-05-28 RX ORDER — LEVOTHYROXINE SODIUM 75 UG/1
75 TABLET ORAL
COMMUNITY

## 2025-05-28 RX ORDER — LABETALOL 100 MG/1
100 TABLET, FILM COATED ORAL
COMMUNITY

## 2025-05-28 RX ORDER — LEVONORGESTREL 52 MG/1
1 INTRAUTERINE DEVICE INTRAUTERINE
COMMUNITY

## 2025-05-28 NOTE — PROGRESS NOTES
S/p bilateral mastectomies with reconstruction for atypia and prophylaxis  She has done well but has some ischemic changes to the right nipple  Will refer to Jaylyn for evaluation  Will see prn

## 2025-06-10 ENCOUNTER — OFFICE VISIT (OUTPATIENT)
Dept: PLASTIC SURGERY | Facility: CLINIC | Age: 50
End: 2025-06-10
Payer: COMMERCIAL

## 2025-06-10 VITALS
BODY MASS INDEX: 28.9 KG/M2 | OXYGEN SATURATION: 98 % | SYSTOLIC BLOOD PRESSURE: 126 MMHG | HEART RATE: 79 BPM | HEIGHT: 63 IN | DIASTOLIC BLOOD PRESSURE: 70 MMHG | WEIGHT: 163.13 LBS

## 2025-06-10 DIAGNOSIS — N60.82 FLAT EPITHELIAL ATYPIA (FEA) OF LEFT BREAST: Primary | ICD-10-CM

## 2025-06-10 DIAGNOSIS — Z98.82 HISTORY OF BREAST IMPLANT: ICD-10-CM

## 2025-06-10 PROCEDURE — 99999 PR PBB SHADOW E&M-EST. PATIENT-LVL III: CPT | Mod: PBBFAC,,, | Performed by: SURGERY

## 2025-06-10 NOTE — PROGRESS NOTES
"Plastic & Reconstructive Surgery  Progress Note     S:  Pt last seen 2 wks ago. She is now 1 mo s/p Removal B implants, capsulectomy, L oncoplastic reduction, and R breast reduction for symmetry.      Overall doing well  Still with some pain from time to time - 3/10  No f/c  Had slight drainage up until last week.   Still happy with size / shape.   No other issues.      O: /70 (BP Location: Right arm, Patient Position: Sitting)   Pulse 79   Ht 5' 3" (1.6 m)   Wt 74 kg (163 lb 2.3 oz)   LMP  (LMP Unknown) Comment: hasn't had a cycle in years  SpO2 98%   BMI 28.90 kg/m²    Gen:  NAD, A&O x3  Breasts: R NAC healing well, though still with slight eschar to central / inferior; slight firmness laterally. IMF well healed. No hematoma / seroma. Slight dog ears medially.      A/P: 50 y.o. female 1 mo s/p Removal B implants, capsulectomy, L oncoplastic reduction, and R breast reduction for symmetry.      Overall doing well.   Cont aquaphor  Start massage / warm compress R breast  Discussed minor revisions for dog ears - can likely be done in office.   Will allow tissue to settle before assessing for major revisions  All questions answered. Patient verbalizes understanding and agreement with treatment plan.      Follow up:  2 mo  Restriction: none     I spent 15 minutes on this patient encounter which included review of medical records.  Over half the time was spent with the patient face to face discussing the treatment plan, counseling and coordinating care.     Chayo Cagle MD  06/10/2025 8:23 AM     This document was transcribed using voice recognition software without a human . It may contain typographical, grammatical, and/or syntax errors.     "

## 2025-06-11 NOTE — PROGRESS NOTES
Plastic & Reconstructive Surgery  Progress Note     S:  1 wk s/p remov B breast implants, capsulectomy, left oncoplastic reduction and right reduction for symmetry.      Overall doing well.   No pain today, but had some intermittent soreness last week  No f/c  No drainage.   Having some nausea - improving.   Worried about appearance of Right nipple  No other issues     O: LMP  (LMP Unknown) Comment: hasn't had a cycle in years   Gen:  NAD, A&O x3  Breasts: incisions well taped. Slight bruising to R breast. R NAC with some ecchymosis. Soft overall. L breast / NAC healing without issue.      A/P: 50 y.o. female 1 wk s/p remov B breast implants, capsulectomy, left oncoplastic reduction and right reduction for symmetry.      Overall ok.   Appears to have hematoma and ecchymosis to right nipple-areolar complex.    We will allow this to the clear slowly over time.    Continue applying Vaseline or Aquaphor as tolerated.    We discussed the possibility of revisions if necessary.    All questions answered. Patient verbalizes understanding and agreement with treatment plan.       Follow up:  1 wk  Restriction: light activity     I spent 15 minutes on this patient encounter which included review of medical records.  Over half the time was spent with the patient face to face discussing the treatment plan, counseling and coordinating care.     Chayo Cagle MD  06/11/2025 11:27 AM     This document was transcribed using voice recognition software without a human . It may contain typographical, grammatical, and/or syntax errors.

## 2025-06-18 ENCOUNTER — OFFICE VISIT (OUTPATIENT)
Dept: SURGERY | Facility: CLINIC | Age: 50
End: 2025-06-18
Payer: COMMERCIAL

## 2025-06-18 VITALS
SYSTOLIC BLOOD PRESSURE: 119 MMHG | HEIGHT: 63 IN | HEART RATE: 73 BPM | BODY MASS INDEX: 28.88 KG/M2 | WEIGHT: 163 LBS | DIASTOLIC BLOOD PRESSURE: 79 MMHG

## 2025-06-18 DIAGNOSIS — N60.82 FLAT EPITHELIAL ATYPIA OF BREAST, LEFT: ICD-10-CM

## 2025-06-18 DIAGNOSIS — Z90.13 S/P MASTECTOMY, BILATERAL: Primary | ICD-10-CM

## 2025-06-18 PROCEDURE — 3078F DIAST BP <80 MM HG: CPT | Mod: CPTII,S$GLB,, | Performed by: PHYSICIAN ASSISTANT

## 2025-06-18 PROCEDURE — 99024 POSTOP FOLLOW-UP VISIT: CPT | Mod: S$GLB,,, | Performed by: PHYSICIAN ASSISTANT

## 2025-06-18 PROCEDURE — 3044F HG A1C LEVEL LT 7.0%: CPT | Mod: CPTII,S$GLB,, | Performed by: PHYSICIAN ASSISTANT

## 2025-06-18 PROCEDURE — 3074F SYST BP LT 130 MM HG: CPT | Mod: CPTII,S$GLB,, | Performed by: PHYSICIAN ASSISTANT

## 2025-06-18 PROCEDURE — 99999 PR PBB SHADOW E&M-EST. PATIENT-LVL III: CPT | Mod: PBBFAC,,, | Performed by: PHYSICIAN ASSISTANT

## 2025-06-18 PROCEDURE — 1159F MED LIST DOCD IN RCRD: CPT | Mod: CPTII,S$GLB,, | Performed by: PHYSICIAN ASSISTANT

## 2025-06-18 NOTE — PROGRESS NOTES
Post-Op  [facility]  Department of Surgery    PCP:  Kelly Bo MD    DIAGNOSIS:    This is a 50 y.o. female with history of FEA of the left breast.    TREATMENT SUMMARY:  The patient is status post bilateral mastectomy for prophylaxis on 5/15/2025.  Final pathology showed FEA and PASH.     INTERVAL HISTORY:   Otilia Meier comes in for a post-op evaluation of post operative nipple changes.  She denies fever, chills, chest pain or shortness of breath.  Her pain is well controlled.      MEDICATIONS:  Current Medications[1]    ALLERGIES:   Review of patient's allergies indicates:   Allergen Reactions    Hydrocodone-acetaminophen Other (See Comments)     vomiting       PHYSICAL EXAMINATION:   General:  This is a well appearing female with appropriate speech, affect and gait.     Breast:  There is small area of eschar at the right nipple (see image in media)      IMPRESSION:   The patient here for evaluation of her post operative wound. Improving.     PLAN:   1. Discussed that the eschar will eventually fall away but she may be left with an area of hypopigmentation. Could consider scar tattoo revision in the future but will await healing and reassess if this is needed.   2. Follow up in two weeks.   3. The patient is advised in continued exam of the breast chest wall and to report to this office sooner should she note any areas of abnormality or concern.              [1]   Current Outpatient Medications   Medication Sig Dispense Refill    albuterol (PROVENTIL/VENTOLIN HFA) 90 mcg/actuation inhaler       DOCOSAHEXAENOIC ACID ORAL Take 2 tablets by mouth.      labetaloL (NORMODYNE) 100 MG tablet Take 100 mg by mouth.      levonorgestreL (MIRENA) 52 mg IUD 1 Intra Uterine Device by Intrauterine route.      levothyroxine (SYNTHROID) 75 MCG tablet Take 75 mcg by mouth.      ondansetron (ZOFRAN-ODT) 4 MG TbDL Dissolve 2 tablets (8 mg total) by mouth 2 (two) times daily. 14 tablet 0    traMADoL (ULTRAM)  50 mg tablet Take 1 tablet (50 mg total) by mouth every 6 (six) hours. 14 tablet 0     No current facility-administered medications for this visit.

## 2025-07-07 ENCOUNTER — TELEPHONE (OUTPATIENT)
Dept: PLASTIC SURGERY | Facility: CLINIC | Age: 50
End: 2025-07-07
Payer: COMMERCIAL

## 2025-07-07 NOTE — TELEPHONE ENCOUNTER
Attempted to contact pt to reschedule appt on 8/12, pt did not answer and a detailed message with call back was left.

## 2025-07-16 ENCOUNTER — OFFICE VISIT (OUTPATIENT)
Dept: SURGERY | Facility: CLINIC | Age: 50
End: 2025-07-16
Payer: COMMERCIAL

## 2025-07-16 VITALS
WEIGHT: 163 LBS | HEART RATE: 74 BPM | BODY MASS INDEX: 28.88 KG/M2 | DIASTOLIC BLOOD PRESSURE: 80 MMHG | HEIGHT: 63 IN | SYSTOLIC BLOOD PRESSURE: 132 MMHG

## 2025-07-16 DIAGNOSIS — Z12.39 SCREENING BREAST EXAMINATION: ICD-10-CM

## 2025-07-16 DIAGNOSIS — N60.82 FLAT EPITHELIAL ATYPIA OF BREAST, LEFT: ICD-10-CM

## 2025-07-16 DIAGNOSIS — Z98.890 STATUS POST EXCISIONAL BIOPSY: ICD-10-CM

## 2025-07-16 DIAGNOSIS — R23.4 SKIN ESCHAR: Primary | ICD-10-CM

## 2025-07-16 PROCEDURE — 99999 PR PBB SHADOW E&M-EST. PATIENT-LVL III: CPT | Mod: PBBFAC,,, | Performed by: PHYSICIAN ASSISTANT

## 2025-07-17 NOTE — PROGRESS NOTES
Post-Op  [facility]  Department of Surgery    PCP:  Kelly Bo MD    DIAGNOSIS:    This is a 50 y.o. female with history of FEA of the left breast.    TREATMENT SUMMARY:  The patient is status post left excisional biopsy and  bilateral breast reduction on 5/15/2025.  Final pathology showed FEA and PASH.     INTERVAL HISTORY:   Otilia Meier comes in for a post-op evaluation of post operative nipple changes.  She denies fever, chills, chest pain or shortness of breath.  Her pain is well controlled.      MEDICATIONS:  Current Medications[1]    ALLERGIES:   Review of patient's allergies indicates:   Allergen Reactions    Hydrocodone-acetaminophen Other (See Comments)     vomiting       PHYSICAL EXAMINATION:   General:  This is a well appearing female with appropriate speech, affect and gait.     Breast:  There is small area of eschar at the right nipple remains although improving.     IMPRESSION:   The patient here for evaluation of her post operative wound. Improving.     PLAN:   1. Discussed that the eschar will eventually fall away but she may be left with an area of hypopigmentation. Could consider scar tattoo revision in the future but will await healing and reassess if this is needed.   2. Follow up with plastic surgery.   3. Will need mammogram once she is completely healed. Will schedule accordingly.   3. The patient is advised in continued exam of the breast chest wall and to report to this office sooner should she note any areas of abnormality or concern.                [1]   Current Outpatient Medications   Medication Sig Dispense Refill    albuterol (PROVENTIL/VENTOLIN HFA) 90 mcg/actuation inhaler       DOCOSAHEXAENOIC ACID ORAL Take 2 tablets by mouth.      labetaloL (NORMODYNE) 100 MG tablet Take 100 mg by mouth.      levonorgestreL (MIRENA) 52 mg IUD 1 Intra Uterine Device by Intrauterine route.      levothyroxine (SYNTHROID) 75 MCG tablet Take 75 mcg by mouth.      ondansetron  (ZOFRAN-ODT) 4 MG TbDL Dissolve 2 tablets (8 mg total) by mouth 2 (two) times daily. 14 tablet 0    traMADoL (ULTRAM) 50 mg tablet Take 1 tablet (50 mg total) by mouth every 6 (six) hours. 14 tablet 0     No current facility-administered medications for this visit.

## 2025-08-19 ENCOUNTER — OFFICE VISIT (OUTPATIENT)
Dept: PLASTIC SURGERY | Facility: CLINIC | Age: 50
End: 2025-08-19
Payer: COMMERCIAL

## 2025-08-19 DIAGNOSIS — N60.82 FLAT EPITHELIAL ATYPIA (FEA) OF LEFT BREAST: Primary | ICD-10-CM

## 2025-08-19 PROCEDURE — 1160F RVW MEDS BY RX/DR IN RCRD: CPT | Mod: CPTII,S$GLB,, | Performed by: SURGERY

## 2025-08-19 PROCEDURE — 1159F MED LIST DOCD IN RCRD: CPT | Mod: CPTII,S$GLB,, | Performed by: SURGERY

## 2025-08-19 PROCEDURE — 99999 PR PBB SHADOW E&M-EST. PATIENT-LVL III: CPT | Mod: PBBFAC,,, | Performed by: SURGERY

## 2025-08-19 PROCEDURE — 99212 OFFICE O/P EST SF 10 MIN: CPT | Mod: S$GLB,,, | Performed by: SURGERY

## 2025-08-19 PROCEDURE — 3044F HG A1C LEVEL LT 7.0%: CPT | Mod: CPTII,S$GLB,, | Performed by: SURGERY

## (undated) DEVICE — APPLICATOR CHLORAPREP ORN 26ML

## (undated) DEVICE — SYR DISP LL 5CC

## (undated) DEVICE — SUT MCRYL PLUS 4-0 PS2 27IN

## (undated) DEVICE — SUT ETHILON 2-0 PSLX 30IN

## (undated) DEVICE — ELECTRODE EXTENDED BLADE

## (undated) DEVICE — DRAPE STERI INSTRUMENT 1018

## (undated) DEVICE — SOL NACL 0.9% IV INJ 1000ML

## (undated) DEVICE — MARKER FN REG DUAL UTIL RULER

## (undated) DEVICE — REMOVER STAPLE SKIN STERILE

## (undated) DEVICE — SPONGE COTTON TRAY 4X4IN

## (undated) DEVICE — Device

## (undated) DEVICE — PAD ABDOMINAL STERILE 8X10IN

## (undated) DEVICE — GOWN SURGICAL X-LARGE

## (undated) DEVICE — SPONGE LAP 18X18 PREWASHED

## (undated) DEVICE — SYR 10CC LUER LOCK

## (undated) DEVICE — TOWEL OR DISP STRL BLUE 4/PK

## (undated) DEVICE — SUT VICRYL 3-0 27 SH

## (undated) DEVICE — SUT 2-0 VICRYL / SH (J417)

## (undated) DEVICE — GAUZE FLUFF XXLG 36X36 2 PLY

## (undated) DEVICE — TRAY MINOR GEN SURG OMC

## (undated) DEVICE — BANDAGE ROLL COTTN 4.5INX4.1YD

## (undated) DEVICE — RETRACTOR RADIALUX LIGHTED

## (undated) DEVICE — SUT MONOCRYL 3-0 PS-2 UND

## (undated) DEVICE — SUT 2-0 VICRYL / CT-1

## (undated) DEVICE — KIT IRR SUCTION HND PIECE

## (undated) DEVICE — DRESSING XEROFORM NONADH 1X8IN

## (undated) DEVICE — COVER PROBE NL STRL 3.6X96IN

## (undated) DEVICE — DRAPE HALF SURGICAL 40X58IN

## (undated) DEVICE — ELECTRODE MEGADYNE RETURN DUAL

## (undated) DEVICE — PENCIL SMK EVAC CONNECTOR 10FT

## (undated) DEVICE — SOL VASHE INSTILLATION WND 16

## (undated) DEVICE — APPLIER CLIP LIAGCLIP 9.375IN

## (undated) DEVICE — STAPLER SKIN REGULAR

## (undated) DEVICE — NDL HYPO REG 25G X 1 1/2

## (undated) DEVICE — SYR IRRIGATION BULB STER 60ML

## (undated) DEVICE — MANIFOLD 4 PORT

## (undated) DEVICE — ELECTRODE REM PLYHSV RETURN 9

## (undated) DEVICE — GOWN AERO CHROME W/ TOWEL XL

## (undated) DEVICE — STAPLER SKIN ROTATING HEAD

## (undated) DEVICE — TIP YANKAUERS BULB NO VENT

## (undated) DEVICE — CUP MEDICINE STERILE 2OZ

## (undated) DEVICE — BRA POST SURGICAL WHT 44-46IN

## (undated) DEVICE — PENCIL ROCKER SWITCH 10FT CORD